# Patient Record
Sex: FEMALE | Race: WHITE | NOT HISPANIC OR LATINO | Employment: FULL TIME | URBAN - METROPOLITAN AREA
[De-identification: names, ages, dates, MRNs, and addresses within clinical notes are randomized per-mention and may not be internally consistent; named-entity substitution may affect disease eponyms.]

---

## 2019-06-10 ENCOUNTER — TRANSFERRED RECORDS (OUTPATIENT)
Dept: MULTI SPECIALTY CLINIC | Facility: CLINIC | Age: 44
End: 2019-06-10

## 2019-06-10 LAB
ALT SERPL-CCNC: 9 U/L (ref 6–29)
AST SERPL-CCNC: 13 U/L (ref 10–35)
CHOLEST SERPL-MCNC: 283 MG/DL
CREAT SERPL-MCNC: 0.65 MG/DL (ref 0.5–1.1)
GFR SERPL CREATININE-BSD FRML MDRD: 107 ML/MIN/1.73M2
GLUCOSE SERPL-MCNC: 83 MG/DL (ref 65–99)
HDLC SERPL-MCNC: 67 MG/DL
HPV ABSTRACT: NORMAL
NONHDLC SERPL-MCNC: 216 MG/DL
PAP-ABSTRACT: NORMAL
POTASSIUM SERPL-SCNC: 3.9 MMOL/L (ref 3.5–5.3)
TRIGL SERPL-MCNC: 218 MG/DL

## 2020-07-07 ENCOUNTER — OFFICE VISIT (OUTPATIENT)
Dept: FAMILY MEDICINE | Facility: CLINIC | Age: 45
End: 2020-07-07
Payer: COMMERCIAL

## 2020-07-07 VITALS
SYSTOLIC BLOOD PRESSURE: 123 MMHG | HEART RATE: 60 BPM | BODY MASS INDEX: 21.85 KG/M2 | HEIGHT: 64 IN | TEMPERATURE: 98.6 F | RESPIRATION RATE: 16 BRPM | DIASTOLIC BLOOD PRESSURE: 77 MMHG | WEIGHT: 128 LBS | OXYGEN SATURATION: 100 %

## 2020-07-07 DIAGNOSIS — Z30.41 ENCOUNTER FOR SURVEILLANCE OF CONTRACEPTIVE PILLS: ICD-10-CM

## 2020-07-07 DIAGNOSIS — Z01.419 ENCOUNTER FOR GYNECOLOGICAL EXAMINATION WITHOUT ABNORMAL FINDING: ICD-10-CM

## 2020-07-07 DIAGNOSIS — Z12.31 ENCOUNTER FOR SCREENING MAMMOGRAM FOR BREAST CANCER: ICD-10-CM

## 2020-07-07 DIAGNOSIS — Z13.1 SCREENING FOR DIABETES MELLITUS: ICD-10-CM

## 2020-07-07 DIAGNOSIS — F33.42 RECURRENT MAJOR DEPRESSIVE DISORDER, IN FULL REMISSION (H): ICD-10-CM

## 2020-07-07 DIAGNOSIS — Z13.220 LIPID SCREENING: ICD-10-CM

## 2020-07-07 DIAGNOSIS — R87.619 ABNORMAL CERVICAL PAPANICOLAOU SMEAR, UNSPECIFIED ABNORMAL PAP FINDING: ICD-10-CM

## 2020-07-07 DIAGNOSIS — M54.50 ACUTE BILATERAL LOW BACK PAIN WITHOUT SCIATICA: ICD-10-CM

## 2020-07-07 DIAGNOSIS — J30.2 SEASONAL ALLERGIES: ICD-10-CM

## 2020-07-07 DIAGNOSIS — Z00.00 ROUTINE GENERAL MEDICAL EXAMINATION AT A HEALTH CARE FACILITY: Primary | ICD-10-CM

## 2020-07-07 DIAGNOSIS — Z80.3 FAMILY HISTORY OF MALIGNANT NEOPLASM OF BREAST: ICD-10-CM

## 2020-07-07 LAB
CHOLEST SERPL-MCNC: 234 MG/DL
GLUCOSE SERPL-MCNC: 88 MG/DL (ref 70–99)
HDLC SERPL-MCNC: 60 MG/DL
LDLC SERPL CALC-MCNC: 135 MG/DL
NONHDLC SERPL-MCNC: 174 MG/DL
TRIGL SERPL-MCNC: 196 MG/DL

## 2020-07-07 PROCEDURE — 99386 PREV VISIT NEW AGE 40-64: CPT | Performed by: FAMILY MEDICINE

## 2020-07-07 PROCEDURE — 82947 ASSAY GLUCOSE BLOOD QUANT: CPT | Performed by: FAMILY MEDICINE

## 2020-07-07 PROCEDURE — 87624 HPV HI-RISK TYP POOLED RSLT: CPT | Performed by: FAMILY MEDICINE

## 2020-07-07 PROCEDURE — 99213 OFFICE O/P EST LOW 20 MIN: CPT | Mod: 25 | Performed by: FAMILY MEDICINE

## 2020-07-07 PROCEDURE — 80061 LIPID PANEL: CPT | Performed by: FAMILY MEDICINE

## 2020-07-07 PROCEDURE — 36415 COLL VENOUS BLD VENIPUNCTURE: CPT | Performed by: FAMILY MEDICINE

## 2020-07-07 PROCEDURE — 88175 CYTOPATH C/V AUTO FLUID REDO: CPT | Performed by: FAMILY MEDICINE

## 2020-07-07 RX ORDER — SERTRALINE HYDROCHLORIDE 100 MG/1
TABLET, FILM COATED ORAL
COMMUNITY
End: 2021-01-06

## 2020-07-07 RX ORDER — SERTRALINE HYDROCHLORIDE 100 MG/1
TABLET, FILM COATED ORAL
Status: CANCELLED | OUTPATIENT
Start: 2020-07-07

## 2020-07-07 RX ORDER — SERTRALINE HYDROCHLORIDE 100 MG/1
100 TABLET, FILM COATED ORAL DAILY
Qty: 90 TABLET | Refills: 1 | Status: SHIPPED | OUTPATIENT
Start: 2020-07-07 | End: 2021-01-06

## 2020-07-07 RX ORDER — CYCLOBENZAPRINE HCL 5 MG
5-10 TABLET ORAL 3 TIMES DAILY PRN
Qty: 30 TABLET | Refills: 0 | Status: SHIPPED | OUTPATIENT
Start: 2020-07-07

## 2020-07-07 RX ORDER — NORGESTIMATE AND ETHINYL ESTRADIOL 0.25-0.035
1 KIT ORAL DAILY
COMMUNITY
End: 2020-07-07

## 2020-07-07 RX ORDER — FEXOFENADINE HCL 180 MG/1
180 TABLET ORAL DAILY
COMMUNITY

## 2020-07-07 RX ORDER — NORGESTIMATE AND ETHINYL ESTRADIOL 0.25-0.035
1 KIT ORAL DAILY
Qty: 84 TABLET | Refills: 3 | Status: SHIPPED | OUTPATIENT
Start: 2020-07-07 | End: 2021-06-24

## 2020-07-07 ASSESSMENT — PATIENT HEALTH QUESTIONNAIRE - PHQ9: SUM OF ALL RESPONSES TO PHQ QUESTIONS 1-9: 0

## 2020-07-07 ASSESSMENT — MIFFLIN-ST. JEOR: SCORE: 1209.57

## 2020-07-07 NOTE — PROGRESS NOTES
SUBJECTIVE:   CC: Maria G Khan is an 45 year old woman who presents for preventive health visit.     Healthy Habits:    Do you get at least three servings of calcium containing foods daily (dairy, green leafy vegetables, etc.)? no, taking calcium and/or vitamin D supplement: no    Amount of exercise or daily activities, outside of work: 2-3 day(s) per week    Problems taking medications regularly No    Medication side effects: No    Have you had an eye exam in the past two years? yes    Do you see a dentist twice per year? no    Do you have sleep apnea, excessive snoring or daytime drowsiness?no    Transferring cares from [place].   Family history of breast cancer. Mom diagnosed with breast cancer at age 45. Pt gets yearly mammograms.   She has history of abnormal pap, advised to repeat pap 06/2020. Last pap 06/2019 was normal. JOSE ALEJANDRO obtained.     Back ache a few years ago. This last time she bought the house and moving containers arrived. She was fixing the mattress and has lower back tightness. She hasn't been stretching. Pain is worse in the morning and slightly improves throughout the day. Pain is moderate, non radiating, no aggravating or reliving factors. No paresthesias, urinary retention, bowel incontinence or saddle anesthesia.       Today's PHQ-2 Score:    PHQ 7/7/2020   PHQ-9 Total Score 0   Q9: Thoughts of better off dead/self-harm past 2 weeks Not at all     Abuse: Current or Past(Physical, Sexual or Emotional)- No  Do you feel safe in your environment? Yes        Social History     Tobacco Use     Smoking status: Not on file   Substance Use Topics     Alcohol use: Not on file     If you drink alcohol do you typically have >3 drinks per day or >7 drinks per week? No                     Reviewed orders with patient.  Reviewed health maintenance and updated orders accordingly - Yes      Mammogram Screening: Patient under age 50, mutual decision reflected in health maintenance.      Pertinent mammograms  "are reviewed under the imaging tab.  History of abnormal Pap smear: YES - updated in Problem List and Health Maintenance accordingly     Reviewed and updated as needed this visit by clinical staff         Reviewed and updated as needed this visit by Provider    ROS:  CONSTITUTIONAL: NEGATIVE for fever, chills, change in weight  INTEGUMENTARU/SKIN: NEGATIVE for worrisome rashes, moles or lesions  EYES: NEGATIVE for vision changes or irritation  ENT: NEGATIVE for ear, mouth and throat problems  RESP: NEGATIVE for significant cough or SOB  BREAST: NEGATIVE for masses, tenderness or discharge  CV: NEGATIVE for chest pain, palpitations or peripheral edema  GI: NEGATIVE for nausea, abdominal pain, heartburn, or change in bowel habits  : NEGATIVE for unusual urinary or vaginal symptoms. Periods are regular.  MUSCULOSKELETAL:   NEURO: NEGATIVE for weakness, dizziness or paresthesias  PSYCHIATRIC: NEGATIVE for changes in mood or affect    OBJECTIVE:   BP (!) 140/76 (BP Location: Left arm, Patient Position: Chair, Cuff Size: Adult Regular)   Pulse 60   Temp 98.6  F (37  C) (Oral)   Resp 16   Ht 1.632 m (5' 4.25\")   Wt 58.1 kg (128 lb)   SpO2 100%   BMI 21.80 kg/m      EXAM:  GENERAL: healthy, alert and no distress  EYES: Eyes grossly normal to inspection, PERRL and conjunctivae and sclerae normal  HENT: ear canals and TM's normal, nose and mouth without ulcers or lesions  NECK: no adenopathy, no asymmetry, masses, or scars and thyroid normal to palpation  RESP: lungs clear to auscultation - no rales, rhonchi or wheezes  BREAST: normal without masses, tenderness or nipple discharge and no palpable axillary masses or adenopathy  CV: regular rate and rhythm, normal S1 S2  ABDOMEN: soft, nontender, no hepatosplenomegaly, no masses and bowel sounds normal   (female): normal female external genitalia, normal urethral meatus, vaginal mucosa pink, moist, well rugated, and normal cervix/adnexa/uterus without masses or " discharge  MS: no gross musculoskeletal defects noted, no edema  SKIN: no suspicious lesions or rashes  NEURO: Normal strength and tone, mentation intact and speech normal  PSYCH: mentation appears normal, affect normal/bright    Diagnostic Test Results:  none     ASSESSMENT/PLAN:       Routine general medical examination at a health care facility    Recurrent major depressive disorder, in full remission (H)  PHQ 7/7/2020   PHQ-9 Total Score 0   Q9: Thoughts of better off dead/self-harm past 2 weeks Not at all     - sertraline (ZOLOFT) 100 MG tablet; Take 1 tablet (100 mg) by mouth daily  Dispense: 90 tablet; Refill: 1    Encounter for surveillance of contraceptive pills  - norgestimate-ethinyl estradiol (ORTHO-CYCLEN) 0.25-35 MG-MCG tablet; Take 1 tablet by mouth daily  Dispense: 84 tablet; Refill: 3    Encounter for gynecological examination without abnormal finding  - Pap imaged thin layer diagnostic with HPV (select HPV order below)  - HPV High Risk Types DNA Cervical    Encounter for screening mammogram for breast cancer  - *MA Screening Digital Bilateral; Future    Family history of malignant neoplasm of breast  - *MA Screening Digital Bilateral; Future    Lipid screening  - Lipid Profile (Chol, Trig, HDL, LDL calc)    Screening for diabetes mellitus  - Glucose    Acute bilateral low back pain without sciatica  - likely strain  - recommended symptomatic tx  - cyclobenzaprine (FLEXERIL) 5 MG tablet; Take 1-2 tablets (5-10 mg) by mouth 3 times daily as needed for muscle spasms  Dispense: 30 tablet; Refill: 0; advised to not drive or take with alcohol       COUNSELING:   Reviewed preventive health counseling, as reflected in patient instructions    There is no height or weight on file to calculate BMI.         has no history on file for tobacco.      Counseling Resources:  ATP IV Guidelines  Pooled Cohorts Equation Calculator  Breast Cancer Risk Calculator  FRAX Risk Assessment  ICSI Preventive  Guidelines  Dietary Guidelines for Americans, 2010  USDA's MyPlate  ASA Prophylaxis  Lung CA Screening    Patricia Long MD  [facility]

## 2020-07-07 NOTE — Clinical Note
Please abstract the following data from this visit with this patient into the appropriate field in Epic:    Tests that can be patient reported without a hard copy:    {Quality Abstract List (Optional):932426}    Other Tests found in the patient's chart through Chart Review/Care Everywhere:    Pap smear done by this [facility] on this date: 6/10/19 normal.    Note to Abstraction: If this section is blank, no results were found via Chart Review/Care Everywhere.

## 2020-07-07 NOTE — PATIENT INSTRUCTIONS
Mammogram [phone]         Preventive Health Recommendations  Female Ages 40 to 49    Yearly exam:     See your health care provider every year in order to  1. Review health changes.   2. Discuss preventive care.    3. Review your medicines if your doctor prescribed any.      Get a Pap test every three years (unless you have an abnormal result and your provider advises testing more often).      If you get Pap tests with HPV test, you only need to test every 5 years, unless you have an abnormal result. You do not need a Pap test if your uterus was removed (hysterectomy) and you have not had cancer.      You should be tested each year for STDs (sexually transmitted diseases), if you're at risk.     Ask your doctor if you should have a mammogram.      Have a colonoscopy (test for colon cancer) if someone in your family has had colon cancer or polyps before age 50.       Have a cholesterol test every 5 years.       Have a diabetes test (fasting glucose) after age 45. If you are at risk for diabetes, you should have this test every 3 years.    Shots: Get a flu shot each year. Get a tetanus shot every 10 years.     Nutrition:     Eat at least 5 servings of fruits and vegetables each day.    Eat whole-grain bread, whole-wheat pasta and brown rice instead of white grains and rice.    Get adequate Calcium and Vitamin D.      Lifestyle    Exercise at least 150 minutes a week (an average of 30 minutes a day, 5 days a week). This will help you control your weight and prevent disease.    Limit alcohol to one drink per day.    No smoking.     Wear sunscreen to prevent skin cancer.    See your dentist every six months for an exam and cleaning.

## 2020-07-09 LAB
COPATH REPORT: NORMAL
PAP: NORMAL

## 2020-07-14 LAB
FINAL DIAGNOSIS: NORMAL
HPV HR 12 DNA CVX QL NAA+PROBE: NEGATIVE
HPV16 DNA SPEC QL NAA+PROBE: NEGATIVE
HPV18 DNA SPEC QL NAA+PROBE: NEGATIVE
SPECIMEN DESCRIPTION: NORMAL
SPECIMEN SOURCE CVX/VAG CYTO: NORMAL

## 2020-07-22 ENCOUNTER — ANCILLARY PROCEDURE (OUTPATIENT)
Dept: MAMMOGRAPHY | Facility: CLINIC | Age: 45
End: 2020-07-22
Attending: FAMILY MEDICINE
Payer: COMMERCIAL

## 2020-07-22 DIAGNOSIS — Z80.3 FAMILY HISTORY OF MALIGNANT NEOPLASM OF BREAST: ICD-10-CM

## 2020-07-22 DIAGNOSIS — Z12.31 ENCOUNTER FOR SCREENING MAMMOGRAM FOR BREAST CANCER: ICD-10-CM

## 2021-01-05 NOTE — PROGRESS NOTES
Maria G Khan is a 45 year old female who is being evaluated via a billable video visit.      How would you like to obtain your AVS? MyChart  If the video visit is dropped, the invitation should be resent by: Send to e-mail at: [email or web address]  Will anyone else be joining your video visit? No      Video Start Time: 12:08pm  Assessment & Plan     (F33.42) Recurrent major depressive disorder, in full remission (H)  (primary encounter diagnosis)  Comment: controlled, tolerating sertraline without side effects.  Has tried wean in the past with worsening symptoms  Plan: sertraline (ZOLOFT) 100 MG tablet        Cont current regimen.  Cont self care practices.  Follow up annually and if worsening symptoms.                     No follow-ups on file.    [facility]     Maria G Khan is a 45 year old who presents to clinic today for the following health issues    HPI       Depression Followup    How are you doing with your depression since your last visit? Improved     Are you having other symptoms that might be associated with depression? No    Have you had a significant life event?  No     Are you feeling anxious or having panic attacks?   No    Do you have any concerns with your use of alcohol or other drugs? No    Social History     Tobacco Use     Smoking status: Never Smoker     Smokeless tobacco: Never Used   Substance Use Topics     Alcohol use: Yes     Drug use: Never     PHQ 7/7/2020   PHQ-9 Total Score 0   Q9: Thoughts of better off dead/self-harm past 2 weeks Not at all     No flowsheet data found.  Last PHQ-9 1/6/2021   1.  Little interest or pleasure in doing things 1   2.  Feeling down, depressed, or hopeless 0   3.  Trouble falling or staying asleep, or sleeping too much 1   4.  Feeling tired or having little energy 1   5.  Poor appetite or overeating 0   6.  Feeling bad about yourself 0   7.  Trouble concentrating 1   8.   Moving slowly or restless 0   Q9: Thoughts of better off dead/self-harm past 2 weeks 0   PHQ-9 Total Score 4   Difficulty at work, home, or with people -         Suicide Assessment Five-step Evaluation and Treatment (SAFE-T)      How many servings of fruits and vegetables do you eat daily?  2-3    On average, how many sweetened beverages do you drink each day (Examples: soda, juice, sweet tea, etc.  Do NOT count diet or artificially sweetened beverages)?   2    How many days per week do you exercise enough to make your heart beat faster? 3 or less    How many minutes a day do you exercise enough to make your heart beat faster? 60 or more    How many days per week do you miss taking your medication? 0      Here today for sertraline renewal.  Recently transferred care here from [place].    Hx of depression, symptoms ongoing since age 12-13, diagnosed age 20.  On the sertraline since age 20.  Initially on 50mg dose, increased at some point to 100mg.  This works well for her.  No side effects.  Did wean off medication when she ran out once, had resurgence of symptoms.     living in [place], he hasnt found job in [place] yet.  Lives with 3 dogs.  Friend lives 3 blocks away.  Bought a house over the summer.   comes up when he can.  She does have 2 friends who live in the neighborhood who she sees.  Had been taking ballet classes prior to covid 5 times a week.  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      Has seen therapist during time of stress after move to [place] and was having marriage problems.      From [place] originally, moved here for job at [employer].  Works as , working virtually currently.  Tough to be apart from .  He is .        Review of Systems   Constitutional, HEENT, cardiovascular, pulmonary, gi and gu systems are negative, except as otherwise noted.      Objective           Vitals:  No vitals were obtained today due to virtual visit.    Physical Exam    GENERAL: Healthy, alert and no distress  EYES: Eyes grossly normal to inspection.  No discharge or erythema, or obvious scleral/conjunctival abnormalities.  RESP: No audible wheeze, cough, or visible cyanosis.  No visible retractions or increased work of breathing.    SKIN: Visible skin clear. No significant rash, abnormal pigmentation or lesions.  NEURO: Cranial nerves grossly intact.  Mentation and speech appropriate for age.  PSYCH: Mentation appears normal, affect normal/bright, judgement and insight intact, normal speech and appearance well-groomed.          Video-Visit Details    Type of service:  Video Visit    Video End Time:12:27 PM    Originating Location (pt. Location): Home    Distant Location (provider location):  [facility]     Platform used for Video Visit: [employer]

## 2021-01-06 ENCOUNTER — VIRTUAL VISIT (OUTPATIENT)
Dept: FAMILY MEDICINE | Facility: CLINIC | Age: 46
End: 2021-01-06
Payer: COMMERCIAL

## 2021-01-06 DIAGNOSIS — F33.42 RECURRENT MAJOR DEPRESSIVE DISORDER, IN FULL REMISSION (H): Primary | ICD-10-CM

## 2021-01-06 PROCEDURE — 99213 OFFICE O/P EST LOW 20 MIN: CPT | Mod: 95 | Performed by: NURSE PRACTITIONER

## 2021-01-06 RX ORDER — SERTRALINE HYDROCHLORIDE 100 MG/1
100 TABLET, FILM COATED ORAL DAILY
Qty: 90 TABLET | Refills: 3 | Status: SHIPPED | OUTPATIENT
Start: 2021-01-06 | End: 2021-06-24

## 2021-01-06 ASSESSMENT — PATIENT HEALTH QUESTIONNAIRE - PHQ9: SUM OF ALL RESPONSES TO PHQ QUESTIONS 1-9: 4

## 2021-06-24 ENCOUNTER — OFFICE VISIT (OUTPATIENT)
Dept: FAMILY MEDICINE | Facility: CLINIC | Age: 46
End: 2021-06-24
Payer: COMMERCIAL

## 2021-06-24 VITALS
SYSTOLIC BLOOD PRESSURE: 126 MMHG | OXYGEN SATURATION: 97 % | WEIGHT: 129 LBS | DIASTOLIC BLOOD PRESSURE: 78 MMHG | TEMPERATURE: 98 F | BODY MASS INDEX: 21.49 KG/M2 | HEIGHT: 65 IN | HEART RATE: 65 BPM | RESPIRATION RATE: 16 BRPM

## 2021-06-24 DIAGNOSIS — Z00.00 ROUTINE GENERAL MEDICAL EXAMINATION AT A HEALTH CARE FACILITY: Primary | ICD-10-CM

## 2021-06-24 DIAGNOSIS — Z11.4 ENCOUNTER FOR SCREENING FOR HIV: ICD-10-CM

## 2021-06-24 DIAGNOSIS — F33.42 RECURRENT MAJOR DEPRESSIVE DISORDER, IN FULL REMISSION (H): ICD-10-CM

## 2021-06-24 DIAGNOSIS — Z13.220 LIPID SCREENING: ICD-10-CM

## 2021-06-24 DIAGNOSIS — Z12.83 SKIN CANCER SCREENING: ICD-10-CM

## 2021-06-24 DIAGNOSIS — Z13.1 SCREENING FOR DIABETES MELLITUS: ICD-10-CM

## 2021-06-24 DIAGNOSIS — Z11.59 ENCOUNTER FOR HEPATITIS C SCREENING TEST FOR LOW RISK PATIENT: ICD-10-CM

## 2021-06-24 DIAGNOSIS — Z30.41 ENCOUNTER FOR SURVEILLANCE OF CONTRACEPTIVE PILLS: ICD-10-CM

## 2021-06-24 DIAGNOSIS — Z12.31 ENCOUNTER FOR SCREENING MAMMOGRAM FOR BREAST CANCER: ICD-10-CM

## 2021-06-24 LAB
CHOLEST SERPL-MCNC: 230 MG/DL
GLUCOSE SERPL-MCNC: 92 MG/DL (ref 70–99)
HCV AB SERPL QL IA: NONREACTIVE
HDLC SERPL-MCNC: 74 MG/DL
HIV 1+2 AB+HIV1 P24 AG SERPL QL IA: NONREACTIVE
LDLC SERPL CALC-MCNC: 122 MG/DL
NONHDLC SERPL-MCNC: 156 MG/DL
TRIGL SERPL-MCNC: 170 MG/DL

## 2021-06-24 PROCEDURE — 87389 HIV-1 AG W/HIV-1&-2 AB AG IA: CPT | Performed by: FAMILY MEDICINE

## 2021-06-24 PROCEDURE — 80061 LIPID PANEL: CPT | Performed by: FAMILY MEDICINE

## 2021-06-24 PROCEDURE — 99396 PREV VISIT EST AGE 40-64: CPT | Performed by: FAMILY MEDICINE

## 2021-06-24 PROCEDURE — 86803 HEPATITIS C AB TEST: CPT | Performed by: FAMILY MEDICINE

## 2021-06-24 PROCEDURE — 36415 COLL VENOUS BLD VENIPUNCTURE: CPT | Performed by: FAMILY MEDICINE

## 2021-06-24 PROCEDURE — 82947 ASSAY GLUCOSE BLOOD QUANT: CPT | Performed by: FAMILY MEDICINE

## 2021-06-24 RX ORDER — NORGESTIMATE AND ETHINYL ESTRADIOL 0.25-0.035
1 KIT ORAL DAILY
Qty: 84 TABLET | Refills: 3 | Status: SHIPPED | OUTPATIENT
Start: 2021-06-24 | End: 2022-05-03

## 2021-06-24 RX ORDER — SERTRALINE HYDROCHLORIDE 100 MG/1
100 TABLET, FILM COATED ORAL DAILY
Qty: 90 TABLET | Refills: 3 | Status: SHIPPED | OUTPATIENT
Start: 2021-06-24 | End: 2022-06-24

## 2021-06-24 ASSESSMENT — MIFFLIN-ST. JEOR: SCORE: 1213.08

## 2021-06-24 ASSESSMENT — PATIENT HEALTH QUESTIONNAIRE - PHQ9: SUM OF ALL RESPONSES TO PHQ QUESTIONS 1-9: 0

## 2021-06-24 NOTE — PROGRESS NOTES
SUBJECTIVE:   CC: Maria G Khan is an 46 year old woman who presents for preventive health visit.     Patient has been advised of split billing requirements and indicates understanding: Yes  Healthy Habits:    Do you get at least three servings of calcium containing foods daily (dairy, green leafy vegetables, etc.)?  no     Amount of exercise or daily activities, outside of work: 4 days/week     Problems taking medications regularly No    Medication side effects: No    Have you had an eye exam in the past two years? yes    Do you see a dentist twice per year? no    Do you have sleep apnea, excessive snoring or daytime drowsiness?no      Lab Results   Component Value Date    PAP NIL 07/07/2020   Birth control - stable, no side effects, needs refills        Today's PHQ-2 Score: see phq9    Abuse: Current or Past(Physical, Sexual or Emotional)- No  Do you feel safe in your environment? No    Have you ever done Advance Care Planning? (For example, a Health Directive, POLST, or a discussion with a medical provider or your loved ones about your wishes): Yes, patient states has an Advance Care Planning document and will bring a copy to the clinic.    Social History     Tobacco Use     Smoking status: Never Smoker     Smokeless tobacco: Never Used   Substance Use Topics     Alcohol use: Yes     If you drink alcohol do you typically have >3 drinks per day or >7 drinks per week? No                     Reviewed orders with patient.  Reviewed health [place]  Pertinent mammograms are reviewed under the imaging tab.    Pertinent mammograms are reviewed under the imaging tab.  History of abnormal Pap smear: NO - age 30- 65 PAP every 3 years recommended  PAP / HPV Latest Ref Rng & Units 7/7/2020   PAP - NIL   HPV 16 DNA NEG:Negative Negative   HPV 18 DNA NEG:Negative Negative   OTHER HR HPV NEG:Negative Negative     Reviewed and updated as needed this visit by clinical staff  Tobacco  Allergies  Meds   Med Hx  Surg Hx   "Fam Hx  Soc Hx        Reviewed and updated as needed this visit by Provider                    ROS:  CONSTITUTIONAL: NEGATIVE for fever, chills, change in weight  INTEGUMENTARU/SKIN: NEGATIVE for worrisome rashes, moles or lesions  EYES: NEGATIVE for vision changes or irritation  ENT: NEGATIVE for ear, mouth and throat problems  RESP: NEGATIVE for significant cough or SOB  BREAST: NEGATIVE for masses, tenderness or discharge  CV: NEGATIVE for chest pain, palpitations or peripheral edema  GI: NEGATIVE for nausea, abdominal pain, heartburn, or change in bowel habits  : NEGATIVE for unusual urinary or vaginal symptoms. Periods are regular.  MUSCULOSKELETAL: NEGATIVE for significant arthralgias or myalgia  NEURO: NEGATIVE for weakness, dizziness or paresthesias  PSYCHIATRIC: NEGATIVE for changes in mood or affect    OBJECTIVE:   /78 (BP Location: Right arm, Patient Position: Chair, Cuff Size: Adult Regular)   Pulse 65   Temp 98  F (36.7  C) (Tympanic)   Resp 16   Ht 1.638 m (5' 4.5\")   Wt 58.5 kg (129 lb)   LMP  (LMP Unknown)   SpO2 97%   BMI 21.80 kg/m    EXAM:  GENERAL: healthy, alert and no distress  EYES: Eyes grossly normal to inspection, P conjunctivae and sclerae normal  HENT: ear canals and TM's normal, nose and mouth without ulcers or lesions  NECK: no adenopathy, no asymmetry, masses, or scars and thyroid normal to palpation  RESP: lungs clear to auscultation - no rales, rhonchi or wheezes  BREAST: normal without masses, tenderness or nipple discharge and no palpable axillary masses or adenopathy  CV: regular rate and rhythm, normal S1 S2  ABDOMEN: soft, nontender, no hepatosplenomegaly, no masses and bowel sounds normal  MS: no gross musculoskeletal defects noted, no edema  SKIN: no suspicious lesions or rashes  NEURO: Normal strength and tone, mentation intact and speech normal  PSYCH: mentation appears normal, affect normal/bright    Diagnostic Test Results:  Labs reviewed in " "Epic    ASSESSMENT/PLAN:     1. Routine general medical examination at a health care facility  Up to date with tetanus - ~ 2013      2. Recurrent major depressive disorder, in full remission (H)  PHQ9 0  - sertraline (ZOLOFT) 100 MG tablet; Take 1 tablet (100 mg) by mouth daily  Dispense: 90 tablet; Refill: 3  - refilled medication for one year, repeat PHQ 9 in six months    3. Encounter for surveillance of contraceptive pills  - stable refill for one  year  - norgestimate-ethinyl estradiol (ORTHO-CYCLEN) 0.25-35 MG-MCG tablet; Take 1 tablet by mouth daily  Dispense: 84 tablet; Refill: 3    4. Encounter for screening mammogram for breast cancer  - [place] Screening Digital Bilateral; Future    5. Lipid screening  - Lipid Profile (Chol, Trig, HDL, LDL calc)    6. Screening for diabetes mellitus  - Glucose    7. Encounter for screening for HIV  - HIV Antigen Antibody Combo    8. Skin cancer screening  - ADULT DERMATOLOGY REFERRAL; Future    9. Encounter for hepatitis C screening test for low risk patient  - Hepatitis C Screen Reflex to HCV RNA Quant and Genotype          Patient has been advised of split billing requirements and indicates understanding: Yes  COUNSELING:   Reviewed preventive health counseling, as reflected in patient instructions    Estimated body mass index is 21.8 kg/m  as calculated from the following:    Height as of this encounter: 1.638 m (5' 4.5\").    Weight as of this encounter: 58.5 kg (129 lb).        She reports that she has never smoked. She has never used smokeless tobacco.      Counseling Resources:  ATP IV Guidelines  Pooled Cohorts Equation Calculator  Breast Cancer Risk Calculator  BRCA-Related Cancer Risk Assessment: FHS-7 Tool  FRAX Risk Assessment  ICSI Preventive Guidelines  Dietary Guidelines for Americans, 2010  USDA's MyPlate  ASA Prophylaxis  Lung CA Screening    Patricia Long MD  [facility]  "

## 2021-07-29 ENCOUNTER — ANCILLARY PROCEDURE (OUTPATIENT)
Dept: MAMMOGRAPHY | Facility: CLINIC | Age: 46
End: 2021-07-29
Attending: FAMILY MEDICINE
Payer: COMMERCIAL

## 2021-07-29 DIAGNOSIS — Z12.31 ENCOUNTER FOR SCREENING MAMMOGRAM FOR BREAST CANCER: ICD-10-CM

## 2021-07-29 PROCEDURE — 77063 BREAST TOMOSYNTHESIS BI: CPT | Mod: GC | Performed by: RADIOLOGY

## 2021-07-29 PROCEDURE — 77067 SCR MAMMO BI INCL CAD: CPT | Mod: GC | Performed by: RADIOLOGY

## 2021-09-02 ENCOUNTER — OFFICE VISIT (OUTPATIENT)
Dept: DERMATOLOGY | Facility: CLINIC | Age: 46
End: 2021-09-02
Attending: FAMILY MEDICINE
Payer: COMMERCIAL

## 2021-09-02 DIAGNOSIS — D18.01 CHERRY ANGIOMA: ICD-10-CM

## 2021-09-02 DIAGNOSIS — Z12.83 SKIN CANCER SCREENING: ICD-10-CM

## 2021-09-02 DIAGNOSIS — L29.9 BRACHIORADIAL PRURITUS: Primary | ICD-10-CM

## 2021-09-02 DIAGNOSIS — D23.9 DERMATOFIBROMA: ICD-10-CM

## 2021-09-02 PROCEDURE — 99203 OFFICE O/P NEW LOW 30 MIN: CPT | Mod: GC | Performed by: DERMATOLOGY

## 2021-09-02 RX ORDER — TRIAMCINOLONE ACETONIDE 1 MG/G
CREAM TOPICAL 2 TIMES DAILY
Qty: 30 G | Refills: 1 | Status: SHIPPED | OUTPATIENT
Start: 2021-09-02 | End: 2023-12-03

## 2021-09-02 ASSESSMENT — PAIN SCALES - GENERAL: PAINLEVEL: NO PAIN (0)

## 2021-09-02 NOTE — NURSING NOTE
Dermatology Rooming Note    Maria G Khan's goals for this visit include:   Chief Complaint   Patient presents with     Derm Problem     Here for full skin check today     Tamie Meadows RN

## 2021-09-02 NOTE — PATIENT INSTRUCTIONS
Patient Education     Checking for Skin Cancer  You can find cancer early by checking your skin each month. There are 3 kinds of skin cancer. They are melanoma, basal cell carcinoma, and squamous cell carcinoma. Doing monthly skin checks is the best way to find new marks or skin changes. Follow the instructions below for checking your skin.   The ABCDEs of checking moles for melanoma   Check your moles or growths for signs of melanoma using ABCDE:     Asymmetry: the sides of the mole or growth don t match    Border: the edges are ragged, notched, or blurred    Color: the color within the mole or growth varies    Diameter: the mole or growth is larger than 6 mm (size of a pencil eraser)    Evolving: the size, shape, or color of the mole or growth is changing (evolving is not shown in the images below)    Checking for other types of skin cancer  Basal cell carcinoma or squamous cell carcinoma have symptoms such as:       A spot or mole that looks different from all other marks on your skin    Changes in how an area feels, such as itching, tenderness, or pain    Changes in the skin's surface, such as oozing, bleeding, or scaliness    A sore that does not heal    New swelling or redness beyond the border of a mole    Who s at risk?  Anyone can get skin cancer. But you are at greater risk if you have:     Fair skin, light-colored hair, or light-colored eyes    Many moles or abnormal moles on your skin    A history of sunburns from sunlight or tanning beds    A family history of skin cancer    A history of exposure to radiation or chemicals    A weakened immune system  If you have had skin cancer in the past, you are at risk for recurring skin cancer.   How to check your skin  Do your monthly skin checkups in front of a full-length mirror. Check all parts of your body, including your:     Head (ears, face, neck, and scalp)    Torso (front, back, and sides)    Arms (tops, undersides, upper, and lower armpits)    Hands  (palms, backs, and fingers, including under the nails)    Buttocks and genitals    Legs (front, back, and sides)    Feet (tops, soles, toes, including under the nails, and between toes)  If you have a lot of moles, take digital photos of them each month. Make sure to take photos both up close and from a distance. These can help you see if any moles change over time.   Most skin changes are not cancer. But if you see any changes in your skin, call your doctor right away. Only he or she can diagnose a problem. If you have skin cancer, seeing your doctor can be the first step toward getting the treatment that could save your life.   [employer] last reviewed this educational content on 4/1/2019 2000-2020 [employer]. [address] [place] [address]. All rights reserved. This information is not intended as a substitute for professional medical care. Always follow your healthcare professional's instructions.       When should I call my doctor?    If you are worsening or not improving, please, contact us or seek urgent care as noted below.     Who should I call with questions (adults)?    [facility] (adult and pediatric): [phone]    [facility] (adult): [phone]    For urgent needs outside of business hours call the [facility] at [phone] and ask for the dermatology resident on call to be paged    If this is a medical emergency and you are unable to reach an ER, Call [phone]    Who should I call with questions (pediatric)?  [facility]- Pediatric Dermatology  Dr. Priscilla Cueva, Dr. Galo Bean, Dr. Yasmin Olivo, CAR Mcclain, Dr. Priscila Almaguer, Dr. Urszula Valencia & Dr. Charly Chou  Non-urgent nurse triage line; [phone]- Josephine and Jayne DE LA ROSA Care Coordinatorlaina Jin (/Complex ) [phone]    If you need a prescription refill, please contact your  pharmacy. Refills are approved or denied by our Physicians during normal business hours, Monday through Fridays  Per office policy, refills will not be granted if you have not been seen within the past year (or sooner depending on your child's condition)    Scheduling Information:  Pediatric Appointment Scheduling and Call Center [phone]  Radiology Scheduling- [phone]  Sedation Unit Scheduling- [phone]  [place] Scheduling- [facility] [phone]; Pediatric Dermatology [phone]  Main  Services: [phone]  Afghan: [phone]  Sammarinese: [phone]  Hmong/Taiwanese/Masood: [phone]  Preadmission Nursing Department Fax Number: [phone] (Fax all pre-operative paperwork to this number)    For urgent matters arising during evenings, weekends, or holidays that cannot wait for normal business hours please call [phone] and ask for the dermatology resident on call to be paged.

## 2021-09-02 NOTE — LETTER
9/2/2021       RE: Maria G Khan  [address]  [facility] [address]     Dear Colleague,    Thank you for referring your patient, Maria G Khan, to the [facility]MAT[facility] [place] at [facility]. Please see a copy of my visit note below.    [facility] Dermatology Note  Encounter Date: Sep 2, 2021  Office Visit     Dermatology Problem List:  1. Multiple benign melanocytic nevi  2. Brachioradial pruritus, R forearm   - TAC 0.1% cream BID PRN  ____________________________________________    Assessment & Plan:     # Multiple benign melanocytic nevi  - ABCDEs: Counseled ABCDEs of melanoma: Asymmetry, Border (irregularity), Color (not uniform, changes in color), Diameter (greater than 6 mm which is about the size of a pencil eraser), and Evolving (any changes in preexisting moles).  - Sun protection: Counseled SPF30+ sunscreen, UPF clothing, sun avoidance, tanning bed avoidance.     # Cherry angiomas   - Benign etiology discussed with pt, no further treatment necessary    # Brachioradial pruritus, R forearm  - Discussed etiology and recommended treatment with TAC 0.1% cream BID prn     # Dermatofibroma, L lower extremity  - benign etiology discussed with pt. No further treatment necessary     Procedures Performed:   None     Follow-up: 2 year(s) in-person, or earlier for new or changing lesions    Staff and Resident:     Sudha eMsa MD  PGY-4 Dermatology Resident    I have seen and examined this patient and agree with the assessment and plan as documented in the resident's note.    Meliton Sultana MD  Dermatology Attending     ____________________________________________    CC: Derm Problem (Here for full skin check today)    HPI:  Ms. Maria G Khan is a(n) 46 year old female who presents today as a new patient for a skin check. She states that she has not previously been seen by a dermatologist, but given her red hair  and light skin type, she should come to the dermatologist for evaluation.  She denies any personal or family history of skin cancer, but she grew up in [place] and spent a lot of time by the pool growing up. Today, she denies any bleeding, non-healing or tender lesions of concern.     Patient is otherwise feeling well, without additional skin concerns.    Labs Reviewed:  N/A    Physical Exam:  Vitals: There were no vitals taken for this visit.  SKIN: Full skin, which includes the head/face, both arms, chest, back, abdomen,both legs, genitalia and/or groin buttocks, digits and/or nails, was examined.  - There are dome shaped bright red papules on the face, chest and back.   - There is a firm tan/flesh colored papule that dimples with lateral pressure on the L lower extremity.  - Multiple regular brown pigmented macules and papules are identified on the face, chest, back, b/l upper and lower extremities.   - Scattered brown macules on sun exposed areas..   - No other lesions of concern on areas examined.     Medications:  Current Outpatient Medications   Medication     cyclobenzaprine (FLEXERIL) 5 MG tablet     fexofenadine (ALLEGRA) 180 MG tablet     Multiple Vitamins-Minerals (MULTIVITAMIN ADULT PO)     norgestimate-ethinyl estradiol (ORTHO-CYCLEN) 0.25-35 MG-MCG tablet     sertraline (ZOLOFT) 100 MG tablet     No current facility-administered medications for this visit.      Past Medical History:   Patient Active Problem List   Diagnosis     Abnormal Pap smear of cervix     MDD (major depressive disorder)     Seasonal allergies     Past Medical History:   Diagnosis Date     MDD (major depressive disorder)      Seasonal allergies        CC Patricia Long MD  [facility]  [address] [place] [phone] on close of this encounter.

## 2021-09-02 NOTE — PROGRESS NOTES
[facility] Dermatology Note  Encounter Date: Sep 2, 2021  Office Visit     Dermatology Problem List:  1. Multiple benign melanocytic nevi  2. Brachioradial pruritus, R forearm   - TAC 0.1% cream BID PRN  ____________________________________________    Assessment & Plan:     # Multiple benign melanocytic nevi  - ABCDEs: Counseled ABCDEs of melanoma: Asymmetry, Border (irregularity), Color (not uniform, changes in color), Diameter (greater than 6 mm which is about the size of a pencil eraser), and Evolving (any changes in preexisting moles).  - Sun protection: Counseled SPF30+ sunscreen, UPF clothing, sun avoidance, tanning bed avoidance.     # Cherry angiomas   - Benign etiology discussed with pt, no further treatment necessary    # Brachioradial pruritus, R forearm  - Discussed etiology and recommended treatment with TAC 0.1% cream BID prn     # Dermatofibroma, L lower extremity  - benign etiology discussed with pt. No further treatment necessary     Procedures Performed:   None     Follow-up: 2 year(s) in-person, or earlier for new or changing lesions    Staff and Resident:     Sudha Mesa MD  PGY-4 Dermatology Resident    I have seen and examined this patient and agree with the assessment and plan as documented in the resident's note.    Meliton Sultana MD  Dermatology Attending     ____________________________________________    CC: Derm Problem (Here for full skin check today)    HPI:  Ms. Maria G Khan is a(n) 46 year old female who presents today as a new patient for a skin check. She states that she has not previously been seen by a dermatologist, but given her red hair and light skin type, she should come to the dermatologist for evaluation.  She denies any personal or family history of skin cancer, but she grew up in [place] and spent a lot of time by the pool growing up. Today, she denies any bleeding, non-healing or tender lesions of concern.     Patient is otherwise feeling well,  without additional skin concerns.    Labs Reviewed:  N/A    Physical Exam:  Vitals: There were no vitals taken for this visit.  SKIN: Full skin, which includes the head/face, both arms, chest, back, abdomen,both legs, genitalia and/or groin buttocks, digits and/or nails, was examined.  - There are dome shaped bright red papules on the face, chest and back.   - There is a firm tan/flesh colored papule that dimples with lateral pressure on the L lower extremity.  - Multiple regular brown pigmented macules and papules are identified on the face, chest, back, b/l upper and lower extremities.   - Scattered brown macules on sun exposed areas..   - No other lesions of concern on areas examined.     Medications:  Current Outpatient Medications   Medication     cyclobenzaprine (FLEXERIL) 5 MG tablet     fexofenadine (ALLEGRA) 180 MG tablet     Multiple Vitamins-Minerals (MULTIVITAMIN ADULT PO)     norgestimate-ethinyl estradiol (ORTHO-CYCLEN) 0.25-35 MG-MCG tablet     sertraline (ZOLOFT) 100 MG tablet     No current facility-administered medications for this visit.      Past Medical History:   Patient Active Problem List   Diagnosis     Abnormal Pap smear of cervix     MDD (major depressive disorder)     Seasonal allergies     Past Medical History:   Diagnosis Date     MDD (major depressive disorder)      Seasonal allergies        CC Patricia Long MD  [facility]  [address] [place] [phone] on close of this encounter.

## 2021-09-25 PROBLEM — D18.01 CHERRY ANGIOMA: Status: ACTIVE | Noted: 2021-09-25

## 2021-09-25 PROBLEM — L29.9 BRACHIORADIAL PRURITUS: Status: ACTIVE | Noted: 2021-09-25

## 2021-09-25 PROBLEM — Z12.83 SKIN CANCER SCREENING: Status: ACTIVE | Noted: 2021-09-25

## 2021-09-25 PROBLEM — D23.9 DERMATOFIBROMA: Status: ACTIVE | Noted: 2021-09-25

## 2021-10-10 ENCOUNTER — HEALTH MAINTENANCE LETTER (OUTPATIENT)
Age: 46
End: 2021-10-10

## 2022-05-08 ENCOUNTER — OFFICE VISIT (OUTPATIENT)
Dept: URGENT CARE | Facility: URGENT CARE | Age: 47
End: 2022-05-08
Payer: COMMERCIAL

## 2022-05-08 ENCOUNTER — ANCILLARY PROCEDURE (OUTPATIENT)
Dept: GENERAL RADIOLOGY | Facility: CLINIC | Age: 47
End: 2022-05-08
Attending: PHYSICIAN ASSISTANT
Payer: COMMERCIAL

## 2022-05-08 VITALS
BODY MASS INDEX: 22.48 KG/M2 | TEMPERATURE: 97.3 F | SYSTOLIC BLOOD PRESSURE: 151 MMHG | HEART RATE: 71 BPM | DIASTOLIC BLOOD PRESSURE: 90 MMHG | OXYGEN SATURATION: 98 % | WEIGHT: 133 LBS

## 2022-05-08 DIAGNOSIS — M79.11 MASTICATORY MYALGIA: ICD-10-CM

## 2022-05-08 DIAGNOSIS — S09.93XA INJURY OF JAW, INITIAL ENCOUNTER: Primary | ICD-10-CM

## 2022-05-08 PROCEDURE — 99214 OFFICE O/P EST MOD 30 MIN: CPT | Performed by: PHYSICIAN ASSISTANT

## 2022-05-08 PROCEDURE — 70250 X-RAY EXAM OF SKULL: CPT | Mod: TC | Performed by: RADIOLOGY

## 2022-05-08 RX ORDER — CYCLOBENZAPRINE HCL 10 MG
10 TABLET ORAL 2 TIMES DAILY PRN
Qty: 14 TABLET | Refills: 0 | Status: SHIPPED | OUTPATIENT
Start: 2022-05-08 | End: 2022-05-10

## 2022-05-08 NOTE — PROGRESS NOTES
(S09.93XA) Injury of jaw, initial encounter  (primary encounter diagnosis)  Plan: XR Skull 1/3 Views, cyclobenzaprine (FLEXERIL)         10 MG tablet    (M79.11) Masticatory myalgia  Plan: cyclobenzaprine (FLEXERIL) 10 MG tablet    30 minutes spent on the date of the encounter doing chart review, history and exam, documentation and further activities per the note     Rest the affected area as much as possible.  Apply ice for 15-20 minutes intermittently as needed and especially after any offending activity. Hot packs are better for muscle spasms and cramping. Daily stretching as tolerated.  As pain recedes, begin normal activities slowly as tolerated.  Consider Physical Therapy after 6 weeks if symptoms not better with conservative care.      Okay to take acetaminophen 500 mg- 2 tabs (Total of 1000 mg) every 8 hrs   Okay to take ibuprofen 200 mg- 3 tabs (Total of 600 mg) every 6 hours        Luis Rosario PA-C  [facility]    Subjective   47 year old who presents to clinic today for the following health issues:    Urgent Care       HPI     Fell last night walking on sidewalk and tripped, landed on chin, cut on chin. Patient states that this occurred around 11 PM.     She cleaned it up with hydrogen peroxide, neosporin and a butterfly was placed.      Patient states that she is having bilateral jaw pain and tenderness. No numbness or tingling.     Review of Systems   Review of Systems   See HPI     Objective    Temp: 97.3  F (36.3  C) Temp src: Temporal BP: (!) 151/90 Pulse: 71     SpO2: 98 %       Physical Exam   Physical Exam  Constitutional:       General: She is not in acute distress.     Appearance: Normal appearance. She is normal weight. She is not ill-appearing, toxic-appearing or diaphoretic.   HENT:      Head: Normocephalic and atraumatic.        Comments: Mild tenderness in the areas shown above (in red) without localized erythema or swelling. There is a small cut in the blue area  shown above. Cut is well healed and without signs of infection.   Cardiovascular:      Rate and Rhythm: Normal rate.      Pulses: Normal pulses.   Pulmonary:      Effort: Pulmonary effort is normal. No respiratory distress.   Neurological:      General: No focal deficit present.      Mental Status: She is alert and oriented to person, place, and time. Mental status is at baseline.      Gait: Gait normal.   Psychiatric:         Mood and Affect: Mood normal.         Behavior: Behavior normal.         Thought Content: Thought content normal.         Judgment: Judgment normal.          No results found for this or any previous visit (from the past 24 hour(s)).      An X-ray was ordered today and reviewed by me. There does not appear to be any evidence of fracture or dislocation. Awaiting radiology report.

## 2022-05-10 ENCOUNTER — VIRTUAL VISIT (OUTPATIENT)
Dept: FAMILY MEDICINE | Facility: CLINIC | Age: 47
End: 2022-05-10
Payer: COMMERCIAL

## 2022-05-10 DIAGNOSIS — U07.1 INFECTION DUE TO 2019 NOVEL CORONAVIRUS: Primary | ICD-10-CM

## 2022-05-10 PROCEDURE — 99213 OFFICE O/P EST LOW 20 MIN: CPT | Mod: 95 | Performed by: NURSE PRACTITIONER

## 2022-05-10 NOTE — PROGRESS NOTES
Maria G is a 47 year old who is being evaluated via a billable video visit.      How would you like to obtain your AVS? MyChart  If the video visit is dropped, the invitation should be resent by: Text to cell phone: [phone]  Will anyone else be joining your video visit? No      Video Start Time: 9:08 AM    Assessment & Plan     Infection due to 2019 novel coronavirus  Symptomatic treatment  Low risk for complications, would not jump to antiviral therapy      Magui Govea NP  [facility]    Subjective   Maria G is a 47 year old who presents for the following health issues: positive for COVID this morning on home test, c/o cough, scratchy throat, slight body aches, symptoms started Sunday morning, no known exposure, only other family member in the house is her     Onset of scratchy throat, nasal congestion, cough onset 2 days ago. Positive home COVID test this a.m.  No fever, no SOB  Has had 2 vaccines and booster  Seasonal allergies but no asthma or high risk status    Objective           Vitals:  No vitals were obtained today due to virtual visit.    Physical Exam   GENERAL: Healthy, alert and no distress  EYES: Eyes grossly normal to inspection.  No discharge or erythema, or obvious scleral/conjunctival abnormalities.  RESP: No audible wheeze, cough, or visible cyanosis.  No visible retractions or increased work of breathing.    SKIN: Visible skin clear. No significant rash, abnormal pigmentation or lesions.  NEURO: Cranial nerves grossly intact.  Mentation and speech appropriate for age.  PSYCH: Mentation appears normal, affect normal/bright, judgement and insight intact, normal speech and appearance well-groomed.                Video-Visit Details    Type of service:  Video Visit    Video End Time:9:14 AM    Originating Location (pt. Location): Home    Distant Location (provider location):  [facility]     Platform used for Video Visit: Memeoirs

## 2022-06-22 DIAGNOSIS — F33.42 RECURRENT MAJOR DEPRESSIVE DISORDER, IN FULL REMISSION (H): ICD-10-CM

## 2022-06-24 RX ORDER — SERTRALINE HYDROCHLORIDE 100 MG/1
100 TABLET, FILM COATED ORAL DAILY
Qty: 90 TABLET | Refills: 0 | Status: SHIPPED | OUTPATIENT
Start: 2022-06-24 | End: 2022-08-08

## 2022-06-24 NOTE — TELEPHONE ENCOUNTER
Medication is being filled for 1 time refill only due to:  PHQ9 due   Future appt 8/8/22  LALO Barry RN  [facility]

## 2022-06-28 DIAGNOSIS — F33.42 RECURRENT MAJOR DEPRESSIVE DISORDER, IN FULL REMISSION (H): ICD-10-CM

## 2022-06-30 RX ORDER — SERTRALINE HYDROCHLORIDE 100 MG/1
100 TABLET, FILM COATED ORAL DAILY
Qty: 90 TABLET | Refills: 0 | OUTPATIENT
Start: 2022-06-30

## 2022-07-18 DIAGNOSIS — Z30.41 ENCOUNTER FOR SURVEILLANCE OF CONTRACEPTIVE PILLS: ICD-10-CM

## 2022-07-20 RX ORDER — NORGESTIMATE AND ETHINYL ESTRADIOL 0.25-0.035
1 KIT ORAL DAILY
Qty: 84 TABLET | Refills: 0 | Status: SHIPPED | OUTPATIENT
Start: 2022-07-20 | End: 2022-08-08

## 2022-07-20 NOTE — TELEPHONE ENCOUNTER
---Prescription approved per [facility] Refill Protocol.       Jesenia Mercer RN BSN     [facility]          --Last visit: 6/24/21 Mercedes.    --Future Visit: 8/8/22 Mercedes for annual.

## 2022-08-04 ASSESSMENT — PATIENT HEALTH QUESTIONNAIRE - PHQ9
SUM OF ALL RESPONSES TO PHQ QUESTIONS 1-9: 2
10. IF YOU CHECKED OFF ANY PROBLEMS, HOW DIFFICULT HAVE THESE PROBLEMS MADE IT FOR YOU TO DO YOUR WORK, TAKE CARE OF THINGS AT HOME, OR GET ALONG WITH OTHER PEOPLE: NOT DIFFICULT AT ALL
SUM OF ALL RESPONSES TO PHQ QUESTIONS 1-9: 2

## 2022-08-08 ENCOUNTER — OFFICE VISIT (OUTPATIENT)
Dept: FAMILY MEDICINE | Facility: CLINIC | Age: 47
End: 2022-08-08
Payer: COMMERCIAL

## 2022-08-08 VITALS
WEIGHT: 136.2 LBS | DIASTOLIC BLOOD PRESSURE: 80 MMHG | OXYGEN SATURATION: 99 % | HEART RATE: 60 BPM | TEMPERATURE: 97.3 F | SYSTOLIC BLOOD PRESSURE: 122 MMHG | HEIGHT: 64 IN | RESPIRATION RATE: 20 BRPM | BODY MASS INDEX: 23.25 KG/M2

## 2022-08-08 DIAGNOSIS — Z30.41 ENCOUNTER FOR SURVEILLANCE OF CONTRACEPTIVE PILLS: ICD-10-CM

## 2022-08-08 DIAGNOSIS — Z00.00 ROUTINE GENERAL MEDICAL EXAMINATION AT A HEALTH CARE FACILITY: Primary | ICD-10-CM

## 2022-08-08 DIAGNOSIS — Z12.11 SCREEN FOR COLON CANCER: ICD-10-CM

## 2022-08-08 DIAGNOSIS — F33.42 RECURRENT MAJOR DEPRESSIVE DISORDER, IN FULL REMISSION (H): ICD-10-CM

## 2022-08-08 DIAGNOSIS — Z51.81 ENCOUNTER FOR THERAPEUTIC DRUG MONITORING: ICD-10-CM

## 2022-08-08 DIAGNOSIS — Z13.220 LIPID SCREENING: ICD-10-CM

## 2022-08-08 DIAGNOSIS — Z12.31 VISIT FOR SCREENING MAMMOGRAM: ICD-10-CM

## 2022-08-08 LAB
ERYTHROCYTE [DISTWIDTH] IN BLOOD BY AUTOMATED COUNT: 12.3 % (ref 10–15)
HCT VFR BLD AUTO: 34.6 % (ref 35–47)
HGB BLD-MCNC: 11.6 G/DL (ref 11.7–15.7)
MCH RBC QN AUTO: 32.1 PG (ref 26.5–33)
MCHC RBC AUTO-ENTMCNC: 33.5 G/DL (ref 31.5–36.5)
MCV RBC AUTO: 96 FL (ref 78–100)
PLATELET # BLD AUTO: 330 10E3/UL (ref 150–450)
RBC # BLD AUTO: 3.61 10E6/UL (ref 3.8–5.2)
WBC # BLD AUTO: 7 10E3/UL (ref 4–11)

## 2022-08-08 PROCEDURE — 96127 BRIEF EMOTIONAL/BEHAV ASSMT: CPT | Performed by: FAMILY MEDICINE

## 2022-08-08 PROCEDURE — 80061 LIPID PANEL: CPT | Performed by: FAMILY MEDICINE

## 2022-08-08 PROCEDURE — 99213 OFFICE O/P EST LOW 20 MIN: CPT | Mod: 25 | Performed by: FAMILY MEDICINE

## 2022-08-08 PROCEDURE — 99396 PREV VISIT EST AGE 40-64: CPT | Performed by: FAMILY MEDICINE

## 2022-08-08 PROCEDURE — 36415 COLL VENOUS BLD VENIPUNCTURE: CPT | Performed by: FAMILY MEDICINE

## 2022-08-08 PROCEDURE — 85027 COMPLETE CBC AUTOMATED: CPT | Performed by: FAMILY MEDICINE

## 2022-08-08 PROCEDURE — 80053 COMPREHEN METABOLIC PANEL: CPT | Performed by: FAMILY MEDICINE

## 2022-08-08 RX ORDER — SERTRALINE HYDROCHLORIDE 100 MG/1
100 TABLET, FILM COATED ORAL DAILY
Qty: 90 TABLET | Refills: 3 | Status: SHIPPED | OUTPATIENT
Start: 2022-08-08 | End: 2023-07-21

## 2022-08-08 RX ORDER — NORGESTIMATE AND ETHINYL ESTRADIOL 0.25-0.035
1 KIT ORAL DAILY
Qty: 84 TABLET | Refills: 3 | Status: SHIPPED | OUTPATIENT
Start: 2022-08-08 | End: 2022-10-13

## 2022-08-08 ASSESSMENT — ANXIETY QUESTIONNAIRES
GAD7 TOTAL SCORE: 2
6. BECOMING EASILY ANNOYED OR IRRITABLE: SEVERAL DAYS
1. FEELING NERVOUS, ANXIOUS, OR ON EDGE: SEVERAL DAYS
7. FEELING AFRAID AS IF SOMETHING AWFUL MIGHT HAPPEN: NOT AT ALL
5. BEING SO RESTLESS THAT IT IS HARD TO SIT STILL: NOT AT ALL
2. NOT BEING ABLE TO STOP OR CONTROL WORRYING: NOT AT ALL
7. FEELING AFRAID AS IF SOMETHING AWFUL MIGHT HAPPEN: NOT AT ALL
GAD7 TOTAL SCORE: 2
8. IF YOU CHECKED OFF ANY PROBLEMS, HOW DIFFICULT HAVE THESE MADE IT FOR YOU TO DO YOUR WORK, TAKE CARE OF THINGS AT HOME, OR GET ALONG WITH OTHER PEOPLE?: NOT DIFFICULT AT ALL
3. WORRYING TOO MUCH ABOUT DIFFERENT THINGS: NOT AT ALL
IF YOU CHECKED OFF ANY PROBLEMS ON THIS QUESTIONNAIRE, HOW DIFFICULT HAVE THESE PROBLEMS MADE IT FOR YOU TO DO YOUR WORK, TAKE CARE OF THINGS AT HOME, OR GET ALONG WITH OTHER PEOPLE: NOT DIFFICULT AT ALL
4. TROUBLE RELAXING: NOT AT ALL

## 2022-08-08 ASSESSMENT — PATIENT HEALTH QUESTIONNAIRE - PHQ9
10. IF YOU CHECKED OFF ANY PROBLEMS, HOW DIFFICULT HAVE THESE PROBLEMS MADE IT FOR YOU TO DO YOUR WORK, TAKE CARE OF THINGS AT HOME, OR GET ALONG WITH OTHER PEOPLE: NOT DIFFICULT AT ALL
SUM OF ALL RESPONSES TO PHQ QUESTIONS 1-9: 2

## 2022-08-08 NOTE — PROGRESS NOTES
SUBJECTIVE:   CC: Maria G Khan is an 47 year old woman who presents for preventive health visit.       Patient has been advised of split billing requirements and indicates understanding: Yes  Healthy Habits:     Taking medications regularly:  0    PHQ-2 Total Score: 0  History of Present Illness       Reason for visit:  Annual checkup and medication refill (oral contraceptives; sertraline HCl)    She eats 2-3 servings of fruits and vegetables daily.She consumes 2 sweetened beverage(s) daily.She exercises with enough effort to increase her heart rate 60 or more minutes per day.  She exercises with enough effort to increase her heart rate 4 days per week.   She is taking medications regularly.    Today's PHQ-9         PHQ-9 Total Score: 2    PHQ-9 Q9 Thoughts of better off dead/self-harm past 2 weeks :   Not at all    How difficult have these problems made it for you to do your work, take care of things at home, or get along with other people: Not difficult at all  Today's KYRA-7 Score: 2    No history of migraines.   No history of DVT/PE.   No smoking.  Needs refill for birth control.     Today's PHQ-2 Score:   PHQ-2 ( 1999 Pfizer) 9/2/2021   Q1: Little interest or pleasure in doing things 0   Q2: Feeling down, depressed or hopeless 0   PHQ-2 Score 0   PHQ-2 Total Score (12-17 Years)- Positive if 3 or more points; Administer PHQ-A if positive 0       Abuse: Current or Past (Physical, Sexual or Emotional) - No  Do you feel safe in your environment? Yes        Social History     Tobacco Use     Smoking status: Never Smoker     Smokeless tobacco: Never Used   Substance Use Topics     Alcohol use: Yes     If you drink alcohol do you typically have >3 drinks per day or >7 drinks per week? No        Reviewed orders with patient.  Reviewed health maintenance and updated orders accordingly - Yes  Labs reviewed in EPIC    Breast Cancer Screening:    FHS-7:   Breast CA Risk Assessment (FHS-7) 7/29/2021   Did any of your  "first-degree relatives have breast or ovarian cancer? Yes   Did any of your relatives have bilateral breast cancer? No   Did any man in your family have breast cancer? No   Did any woman in your family have breast and ovarian cancer? No   Did any woman in your family have breast cancer before age 50 y? Yes   Do you have 2 or more relatives with breast and/or ovarian cancer? No   Do you have 2 or more relatives with breast and/or bowel cancer? No       Pertinent mammograms are reviewed under the imaging tab.    History of abnormal Pap smear: NO - age 30- 65 PAP every 3 years recommended  PAP / HPV Latest Ref Rng & Units 7/7/2020   PAP (Historical) - NIL   HPV16 NEG:Negative Negative   HPV18 NEG:Negative Negative   HRHPV NEG:Negative Negative     Reviewed and updated as needed this visit by clinical staff                    Reviewed and updated as needed this visit by Provider                       Review of Systems       OBJECTIVE:   Physical Exam   /80 (BP Location: Right arm, Patient Position: Sitting, Cuff Size: Adult Regular)   Pulse 60   Temp 97.3  F (36.3  C) (Temporal)   Resp 20   Ht 1.626 m (5' 4\")   Wt 61.8 kg (136 lb 3.2 oz)   LMP 08/07/2022   SpO2 99%   BMI 23.38 kg/m    GENERAL: healthy, alert and no distress  EYES: Eyes grossly normal to inspection, conjunctivae and sclerae normal  HENT: ear canals and TM's normal  NECK: no adenopathy, no asymmetry, masses, or scars and thyroid normal to palpation  RESP: lungs clear to auscultation - no rales, rhonchi or wheezes  BREAST: normal without masses, tenderness or nipple discharge and no palpable axillary masses or adenopathy  CV: regular rate and rhythm, normal S1 S2,  ABDOMEN: soft, nontender, no hepatosplenomegaly, no masses and bowel sounds normal  MS: no gross musculoskeletal defects noted, no edema  SKIN: no suspicious lesions or rashes  NEURO: Normal strength and tone, mentation intact and speech normal  PSYCH: mentation appears normal, " "affect normal/bright    Diagnostic Test Results:  Labs reviewed in Epic    ASSESSMENT/PLAN:     Routine general medical examination at a health care facility  - Up to date with tetanus - ~ 2013    - up to date with pap smear, due 7/2023  - followed by dermatology    Screen for colon cancer  - PEPE([employer])    Recurrent major depressive disorder, in full remission (H)  -   PHQ 1/6/2021 6/24/2021 8/4/2022   PHQ-9 Total Score 4 0 2   Q9: Thoughts of better off dead/self-harm past 2 weeks Not at all Not at all Not at all   - stable, refill below  - sertraline (ZOLOFT) 100 MG tablet; Take 1 tablet (100 mg) by mouth daily  Dispense: 90 tablet; Refill: 3    Visit for screening mammogram  - MA SCREENING DIGITAL BILAT - Future  (s+30); Future    Encounter for surveillance of contraceptive pills  No history of migraines.   No history of DVT/PE.   No smoking.  - norgestimate-ethinyl estradiol (ORTHO-CYCLEN) 0.25-35 MG-MCG tablet; Take 1 tablet by mouth daily  Dispense: 84 tablet; Refill: 3    Encounter for therapeutic drug monitoring  - CBC with platelets; Future  - Comprehensive metabolic panel (BMP + Alb, Alk Phos, ALT, AST, Total. Bili, TP); Future  - CBC with platelets  - Comprehensive metabolic panel (BMP + Alb, Alk Phos, ALT, AST, Total. Bili, TP)    Lipid screening  - Lipid Profile (Chol, Trig, HDL, LDL calc); Future  - Lipid Profile (Chol, Trig, HDL, LDL calc)        Patient has been advised of split billing requirements and indicates understanding: Yes    COUNSELING:  Reviewed preventive health counseling, as reflected in patient instructions    Estimated body mass index is 22.48 kg/m  as calculated from the following:    Height as of 6/24/21: 1.638 m (5' 4.5\").    Weight as of 5/8/22: 60.3 kg (133 lb).        She reports that she has never smoked. She has never used smokeless tobacco.      Counseling Resources:  ATP IV Guidelines  Pooled Cohorts Equation Calculator  Breast Cancer Risk " Calculator  BRCA-Related Cancer Risk Assessment: FHS-7 Tool  FRAX Risk Assessment  ICSI Preventive Guidelines  Dietary Guidelines for Americans, 2010  USDA's MyPlate  ASA Prophylaxis  Lung CA Screening    Patricia Long MD  [facility]

## 2022-08-11 LAB
ALBUMIN SERPL-MCNC: 3.6 G/DL (ref 3.4–5)
ALP SERPL-CCNC: 42 U/L (ref 40–150)
ALT SERPL W P-5'-P-CCNC: 22 U/L (ref 0–50)
ANION GAP SERPL CALCULATED.3IONS-SCNC: 6 MMOL/L (ref 3–14)
AST SERPL W P-5'-P-CCNC: 24 U/L (ref 0–45)
BILIRUB SERPL-MCNC: 0.2 MG/DL (ref 0.2–1.3)
BUN SERPL-MCNC: 11 MG/DL (ref 7–30)
CALCIUM SERPL-MCNC: 9 MG/DL (ref 8.5–10.1)
CHLORIDE BLD-SCNC: 108 MMOL/L (ref 94–109)
CHOLEST SERPL-MCNC: 278 MG/DL
CO2 SERPL-SCNC: 25 MMOL/L (ref 20–32)
CREAT SERPL-MCNC: 0.72 MG/DL (ref 0.52–1.04)
FASTING STATUS PATIENT QL REPORTED: YES
GFR SERPL CREATININE-BSD FRML MDRD: >90 ML/MIN/1.73M2
GLUCOSE BLD-MCNC: 82 MG/DL (ref 70–99)
HDLC SERPL-MCNC: 72 MG/DL
LDLC SERPL CALC-MCNC: 177 MG/DL
NONHDLC SERPL-MCNC: 206 MG/DL
POTASSIUM BLD-SCNC: 4 MMOL/L (ref 3.4–5.3)
PROT SERPL-MCNC: 7.2 G/DL (ref 6.8–8.8)
SODIUM SERPL-SCNC: 139 MMOL/L (ref 133–144)
TRIGL SERPL-MCNC: 145 MG/DL

## 2022-08-26 LAB — NONINV COLON CA DNA+OCC BLD SCRN STL QL: NEGATIVE

## 2022-09-24 ENCOUNTER — HEALTH MAINTENANCE LETTER (OUTPATIENT)
Age: 47
End: 2022-09-24

## 2022-09-29 ENCOUNTER — IMMUNIZATION (OUTPATIENT)
Dept: FAMILY MEDICINE | Facility: CLINIC | Age: 47
End: 2022-09-29
Payer: COMMERCIAL

## 2022-09-29 DIAGNOSIS — Z23 NEED FOR PROPHYLACTIC VACCINATION AND INOCULATION AGAINST INFLUENZA: ICD-10-CM

## 2022-09-29 DIAGNOSIS — Z23 HIGH PRIORITY FOR 2019-NCOV VACCINE: ICD-10-CM

## 2022-09-29 DIAGNOSIS — Z00.00 HEALTH CARE MAINTENANCE: Primary | ICD-10-CM

## 2022-09-29 PROCEDURE — 0124A COVID-19,PF,PFIZER BOOSTER BIVALENT: CPT

## 2022-09-29 PROCEDURE — 99207 PR NO CHARGE NURSE ONLY: CPT

## 2022-09-29 PROCEDURE — 91312 COVID-19,PF,PFIZER BOOSTER BIVALENT: CPT

## 2022-10-12 ENCOUNTER — MYC MEDICAL ADVICE (OUTPATIENT)
Dept: FAMILY MEDICINE | Facility: CLINIC | Age: 47
End: 2022-10-12

## 2022-10-12 DIAGNOSIS — Z30.41 ENCOUNTER FOR SURVEILLANCE OF CONTRACEPTIVE PILLS: ICD-10-CM

## 2022-10-13 RX ORDER — NORGESTIMATE AND ETHINYL ESTRADIOL 0.25-0.035
1 KIT ORAL DAILY
Qty: 84 TABLET | Refills: 2 | Status: SHIPPED | OUTPATIENT
Start: 2022-10-13 | End: 2023-05-31

## 2022-10-13 NOTE — TELEPHONE ENCOUNTER
OCP resent.    Writer responded via ToonTime.    MECHELLE DavisN, RN-BC  [facility]

## 2022-10-14 ENCOUNTER — ANCILLARY PROCEDURE (OUTPATIENT)
Dept: MAMMOGRAPHY | Facility: CLINIC | Age: 47
End: 2022-10-14
Attending: FAMILY MEDICINE
Payer: COMMERCIAL

## 2022-10-14 DIAGNOSIS — Z12.31 VISIT FOR SCREENING MAMMOGRAM: ICD-10-CM

## 2022-10-14 PROCEDURE — 77063 BREAST TOMOSYNTHESIS BI: CPT | Mod: GC

## 2022-10-14 PROCEDURE — 77067 SCR MAMMO BI INCL CAD: CPT | Mod: GC

## 2023-01-17 ENCOUNTER — OFFICE VISIT (OUTPATIENT)
Dept: OPTOMETRY | Facility: CLINIC | Age: 48
End: 2023-01-17
Payer: COMMERCIAL

## 2023-01-17 DIAGNOSIS — H52.4 MYOPIA OF BOTH EYES WITH ASTIGMATISM AND PRESBYOPIA: Primary | ICD-10-CM

## 2023-01-17 DIAGNOSIS — H52.203 MYOPIA OF BOTH EYES WITH ASTIGMATISM AND PRESBYOPIA: Primary | ICD-10-CM

## 2023-01-17 DIAGNOSIS — H52.13 MYOPIA OF BOTH EYES WITH ASTIGMATISM AND PRESBYOPIA: Primary | ICD-10-CM

## 2023-01-17 ASSESSMENT — EXTERNAL EXAM - RIGHT EYE: OD_EXAM: NORMAL

## 2023-01-17 ASSESSMENT — TONOMETRY
OS_IOP_MMHG: 16
OD_IOP_MMHG: 19
IOP_METHOD: ICARE

## 2023-01-17 ASSESSMENT — CONF VISUAL FIELD
OD_SUPERIOR_NASAL_RESTRICTION: 0
OD_INFERIOR_TEMPORAL_RESTRICTION: 0
OS_SUPERIOR_TEMPORAL_RESTRICTION: 0
METHOD: COUNTING FINGERS
OS_INFERIOR_TEMPORAL_RESTRICTION: 0
OS_NORMAL: 1
OD_SUPERIOR_TEMPORAL_RESTRICTION: 0
OS_SUPERIOR_NASAL_RESTRICTION: 0
OS_INFERIOR_NASAL_RESTRICTION: 0
OD_INFERIOR_NASAL_RESTRICTION: 0
OD_NORMAL: 1

## 2023-01-17 ASSESSMENT — REFRACTION_MANIFEST
OS_CYLINDER: +1.25
OD_ADD: +1.75
OS_SPHERE: -3.75
OS_ADD: +1.75
OD_SPHERE: -3.50
OD_CYLINDER: +1.00
OS_AXIS: 060
OD_AXIS: 085

## 2023-01-17 ASSESSMENT — REFRACTION_WEARINGRX
OD_SPHERE: -3.50
OS_AXIS: 066
OD_CYLINDER: +1.25
OD_AXIS: 100
OS_CYLINDER: +1.25
OS_SPHERE: -3.75

## 2023-01-17 ASSESSMENT — SLIT LAMP EXAM - LIDS
COMMENTS: NORMAL
COMMENTS: NORMAL

## 2023-01-17 ASSESSMENT — CUP TO DISC RATIO
OS_RATIO: 0.35
OD_RATIO: 0.35

## 2023-01-17 ASSESSMENT — VISUAL ACUITY
OD_CC: 20/20
METHOD: SNELLEN - LINEAR
OS_CC: 20/20
CORRECTION_TYPE: GLASSES

## 2023-01-17 ASSESSMENT — EXTERNAL EXAM - LEFT EYE: OS_EXAM: NORMAL

## 2023-01-17 NOTE — PROGRESS NOTES
A/P  1.) Myopia/Astig/Presbyopia OU  -Doing well in current Rx. Symptomatic for presbyopia, needs add at this time  -Reviewed PAL and adaptation  -Dilated ocular health unremarkable OU    Monitor 1-2 years comprehensive, sooner prn    I have confirmed the patient's CC, HPI and reviewed Past Medical History, Past Surgical History, Social History, Family History, Problem List, Medication List and agree with Tech note.     Salma Oro, OD FAAO [facility]

## 2023-02-27 ENCOUNTER — MYC MEDICAL ADVICE (OUTPATIENT)
Dept: FAMILY MEDICINE | Facility: CLINIC | Age: 48
End: 2023-02-27
Payer: COMMERCIAL

## 2023-02-27 DIAGNOSIS — U07.1 INFECTION DUE TO 2019 NOVEL CORONAVIRUS: Primary | ICD-10-CM

## 2023-02-27 NOTE — TELEPHONE ENCOUNTER
RN COVID TREATMENT VISIT  02/27/23      The patient has been triaged and does not require a higher level of care.    Maria G Khan  47 year old  Current weight? 130lb    Has the patient been seen by a primary care provider at an [facility] or [facility] within the past two years? Yes.   Have you been in close proximity to/do you have a known exposure to a person with a confirmed case of influenza? No.     General treatment eligibility:  Date of positive COVID test (PCR or at home)?  2/27/23    Are you or have you been hospitalized for this COVID-19 infection? No.   Have you received monoclonal antibodies or antiviral treatment for COVID-19 since this positive test? No.   Do you have any of the following conditions that place you at risk of being very sick from COVID-19?   - Mental health disorders including mood disorders, depression, schizophrenia spectrum disorders   Yes, patient has at least one high risk condition as noted above.     Current COVID symptoms:   - cough  - fatigue  - muscle or body aches  - headache  - sore throat  Yes. Patient has at least one symptom as selected.     How many days since symptoms started? 5 days or less. Established patient, 12 years or older weighing at least 88.2 lbs, who has symptoms that started in the past 5 days, has not been hospitalized nor received treatment already, and is at risk for being very sick from COVID-19.     Treatment eligibility by RN:    Are you currently pregnant or nursing? No    Do you have a clinically significant hypersensitivity to nirmatrelvir or ritonavir, or toxic epidermal necrolysis (TEN) or Matta-Cesar Syndrome? No    Do you have a history of hepatitis, any hepatic impairment on the Problem List (such as Child-Bah Class C, cirrhosis, fatty liver disease, alcoholic liver disease), or was the last liver lab (hepatic panel, ALT, AST, ALK Phos, bilirubin) elevated in the past 6 months? No    Do you have any  history of severe renal impairment (eGFR < 30mL/min)? No    Is patient eligible to continue?   Yes, patient meets all eligibility requirements for the RN COVID treatment (as denoted by all no responses above).     Current Outpatient Medications   Medication Sig Dispense Refill     cyclobenzaprine (FLEXERIL) 5 MG tablet Take 1-2 tablets (5-10 mg) by mouth 3 times daily as needed for muscle spasms (Patient not taking: Reported on 8/8/2022) 30 tablet 0     fexofenadine (ALLEGRA) 180 MG tablet Take 180 mg by mouth daily       Multiple Vitamins-Minerals (MULTIVITAMIN ADULT PO)        norgestimate-ethinyl estradiol (ORTHO-CYCLEN) 0.25-35 MG-MCG tablet Take 1 tablet by mouth daily 84 tablet 2     sertraline (ZOLOFT) 100 MG tablet Take 1 tablet (100 mg) by mouth daily 90 tablet 3     triamcinolone (KENALOG) 0.1 % external cream Apply topically 2 times daily To itchy area on R arm 30 g 1       Medications from List 1 of the standing order (on medications that exclude the use of Paxlovid) that patient is taking: NONE. Is patient taking [place]'s Wort? No  Is patient taking [place]'s Wort or any meds from List 1? No.   Medications from List 2 of the standing order (on meds that provider needs to adjust) that patient is taking: NONE. Is patient on any of the meds from List 2? No.   Medications from List 3 of standing order (on meds that a RN needs to adjust) that patient is taking: NONE. Is patient on any meds from List 3? No.     Paxlovid has an approximate 90% reduction in hospitalization. Paxlovid can possibly cause altered sense of taste, diarrhea (loose, watery stools), high blood pressure, muscle aches.     Would patient like a Paxlovid prescription?   Yes.   Lab Results   Component Value Date    GFRESTIMATED >90 08/08/2022       Was last eGFR reduced? No, eGFR 60 or greater/ No Result on record. Patient can receive the normal renal function dose. Paxlovid Rx sent to [place] [facility]   [facility]    Temporary change to  home medications: None    All medication adjustments (holds, etc) were discussed with the patient and patient was asked to repeat back (teachback) their med adjustment.  Did patient understand med adjustment? Yes, patient repeated back and understood correctly.        Reviewed the following instructions with the patient:    Paxlovid (nimatrelvir and ritonavir)    How it works  Two medicines (nirmatrelvir and ritonavir) are taken together. They stop the virus from growing. Less amount of virus is easier for your body to fight.    How to take    Medicine comes in a daily container with both medicine tablets. Take by mouth twice daily (once in the morning, once at night) for 5 days.    The number of tablets to take varies by patient.    Don't chew or break capsules. Swallow whole.    When to take  Take as soon as possible after positive COVID-19 test result, and within 5 days of your first symptoms.    Possible side effects  Can cause altered sense of taste, diarrhea (loose, watery stools), high blood pressure, muscle aches.    Charito Membreno RN

## 2023-02-27 NOTE — TELEPHONE ENCOUNTER
Writer responded via [employer].    MECHELLE DavisN, RN-BC  [facility]

## 2023-03-07 ENCOUNTER — TELEPHONE (OUTPATIENT)
Dept: DERMATOLOGY | Facility: CLINIC | Age: 48
End: 2023-03-07
Payer: COMMERCIAL

## 2023-05-18 ENCOUNTER — OFFICE VISIT (OUTPATIENT)
Dept: URGENT CARE | Facility: URGENT CARE | Age: 48
End: 2023-05-18
Payer: COMMERCIAL

## 2023-05-18 VITALS
HEART RATE: 64 BPM | DIASTOLIC BLOOD PRESSURE: 75 MMHG | TEMPERATURE: 97.6 F | OXYGEN SATURATION: 98 % | SYSTOLIC BLOOD PRESSURE: 141 MMHG

## 2023-05-18 DIAGNOSIS — H69.91 DYSFUNCTION OF RIGHT EUSTACHIAN TUBE: ICD-10-CM

## 2023-05-18 DIAGNOSIS — R42 VERTIGO: Primary | ICD-10-CM

## 2023-05-18 PROCEDURE — 99213 OFFICE O/P EST LOW 20 MIN: CPT

## 2023-05-18 RX ORDER — MECLIZINE HYDROCHLORIDE 25 MG/1
25 TABLET ORAL 3 TIMES DAILY PRN
Qty: 25 TABLET | Refills: 0 | Status: SHIPPED | OUTPATIENT
Start: 2023-05-18 | End: 2023-05-24

## 2023-05-18 RX ORDER — ONDANSETRON 4 MG/1
4 TABLET, ORALLY DISINTEGRATING ORAL EVERY 8 HOURS PRN
Qty: 25 TABLET | Refills: 0 | Status: SHIPPED | OUTPATIENT
Start: 2023-05-18 | End: 2023-12-03

## 2023-05-18 ASSESSMENT — ENCOUNTER SYMPTOMS
SORE THROAT: 0
FEVER: 0
COUGH: 0
VOMITING: 1
DIZZINESS: 1
NAUSEA: 1

## 2023-05-19 NOTE — PROGRESS NOTES
SUBJECTIVE:   Maria G Khan is a 48 year old female presenting with a chief complaint of   Chief Complaint   Patient presents with     Dizziness     Since this morning, no ear pain, feels like ear pressure, vomiting and nausea today        She is an established patient of [facility].  Patient presents with complaints of right ear pressure, nausea and vomiting x 5 today.  Patient has had vertigo in past, a few incidents.  No problems with ears known.      Treatment:  Sudafed and benadryl.      Review of Systems   Constitutional: Negative for fever.   HENT: Positive for congestion and hearing loss. Negative for sore throat.    Respiratory: Negative for cough.    Gastrointestinal: Positive for nausea and vomiting.   Neurological: Positive for dizziness.   All other systems reviewed and are negative.      Past Medical History:   Diagnosis Date     MDD (major depressive disorder)      Seasonal allergies      Family History   Problem Relation Age of Onset     Breast Cancer Mother      Hypertension Father      Heart Failure Maternal Grandmother      Cerebrovascular Disease Paternal Grandmother      Dementia Other      Current Outpatient Medications   Medication Sig Dispense Refill     aspirin (ASA) 81 MG EC tablet Take 1 tablet (81 mg) by mouth daily 0.1 tablet 0     fexofenadine (ALLEGRA) 180 MG tablet Take 180 mg by mouth daily       meclizine (ANTIVERT) 25 MG tablet Take 1 tablet (25 mg) by mouth 3 times daily as needed for dizziness 25 tablet 0     Multiple Vitamins-Minerals (MULTIVITAMIN ADULT PO)        norgestimate-ethinyl estradiol (ORTHO-CYCLEN) 0.25-35 MG-MCG tablet Take 1 tablet by mouth daily 84 tablet 2     ondansetron (ZOFRAN ODT) 4 MG ODT tab Take 1 tablet (4 mg) by mouth every 8 hours as needed for nausea 25 tablet 0     sertraline (ZOLOFT) 100 MG tablet Take 1 tablet (100 mg) by mouth daily 90 tablet 3     cyclobenzaprine (FLEXERIL) 5 MG tablet Take 1-2 tablets (5-10 mg) by mouth 3 times daily as needed  for muscle spasms (Patient not taking: Reported on 8/8/2022) 30 tablet 0     triamcinolone (KENALOG) 0.1 % external cream Apply topically 2 times daily To itchy area on R arm (Patient not taking: Reported on 5/18/2023) 30 g 1     Social History     Tobacco Use     Smoking status: Never     Smokeless tobacco: Never   Vaping Use     Vaping status: Not on file   Substance Use Topics     Alcohol use: Yes       OBJECTIVE  BP (!) 141/75 (BP Location: Left arm, Patient Position: Sitting, Cuff Size: Adult Large)   Pulse 64   Temp 97.6  F (36.4  C) (Tympanic)   LMP 05/14/2023 (Exact Date)   SpO2 98%     Physical Exam  Vitals and nursing note reviewed.   Constitutional:       Appearance: Normal appearance. She is normal weight.   HENT:      Head: Normocephalic and atraumatic.      Right Ear: Tympanic membrane, ear canal and external ear normal.      Left Ear: Tympanic membrane, ear canal and external ear normal.      Ears:      Comments: Right TM normal with clear fluid behind TM.     Nose: Nose normal.      Mouth/Throat:      Mouth: Mucous membranes are moist.      Pharynx: Oropharynx is clear.   Eyes:      Extraocular Movements: Extraocular movements intact.      Conjunctiva/sclera: Conjunctivae normal.   Cardiovascular:      Rate and Rhythm: Normal rate and regular rhythm.      Pulses: Normal pulses.      Heart sounds: Normal heart sounds.   Pulmonary:      Effort: Pulmonary effort is normal.      Breath sounds: Normal breath sounds.   Musculoskeletal:      Cervical back: Normal range of motion.   Skin:     General: Skin is warm and dry.      Findings: No rash.   Neurological:      General: No focal deficit present.      Mental Status: She is alert.   Psychiatric:         Mood and Affect: Mood normal.         Behavior: Behavior normal.         Labs:  No results found for this or any previous visit (from the past 24 hour(s)).    X-Ray was not done.    ASSESSMENT:      ICD-10-CM    1. Vertigo  R42 ondansetron (ZOFRAN  ODT) 4 MG ODT tab     meclizine (ANTIVERT) 25 MG tablet      2. Dysfunction of right eustachian tube  H69.81            Medical Decision Making:    Differential Diagnosis:  Vertigo, OM, eustation tube dysfunction.    Serious Comorbid Conditions:  Adult:  reviewed    PLAN:    Rx for antivert and zofran.  Patient education.  Discussed reasons to seek immediate medical attention.  Additionally if no improvement or worsening in one week, may follow up with PCP and/or UC.        Followup:    If not improving or if condition worsens, follow up with your Primary Care Provider, If not improving or if conditions worsens over the next 12-24 hours, go to the Emergency Department    There are no Patient Instructions on file for this visit.

## 2023-05-21 ENCOUNTER — MYC MEDICAL ADVICE (OUTPATIENT)
Dept: FAMILY MEDICINE | Facility: CLINIC | Age: 48
End: 2023-05-21
Payer: COMMERCIAL

## 2023-05-23 ASSESSMENT — PATIENT HEALTH QUESTIONNAIRE - PHQ9
SUM OF ALL RESPONSES TO PHQ QUESTIONS 1-9: 2
10. IF YOU CHECKED OFF ANY PROBLEMS, HOW DIFFICULT HAVE THESE PROBLEMS MADE IT FOR YOU TO DO YOUR WORK, TAKE CARE OF THINGS AT HOME, OR GET ALONG WITH OTHER PEOPLE: SOMEWHAT DIFFICULT
SUM OF ALL RESPONSES TO PHQ QUESTIONS 1-9: 2

## 2023-05-23 NOTE — TELEPHONE ENCOUNTER
See 5/22/23 telephone encounter.  MECHELLE DavisN, RN-BC  [facility]

## 2023-05-24 ENCOUNTER — OFFICE VISIT (OUTPATIENT)
Dept: FAMILY MEDICINE | Facility: CLINIC | Age: 48
End: 2023-05-24
Payer: COMMERCIAL

## 2023-05-24 VITALS
HEIGHT: 64 IN | HEART RATE: 67 BPM | OXYGEN SATURATION: 97 % | RESPIRATION RATE: 16 BRPM | TEMPERATURE: 97.3 F | WEIGHT: 133 LBS | SYSTOLIC BLOOD PRESSURE: 137 MMHG | DIASTOLIC BLOOD PRESSURE: 87 MMHG | BODY MASS INDEX: 22.71 KG/M2

## 2023-05-24 DIAGNOSIS — H69.91 ACUTE DYSFUNCTION OF RIGHT EUSTACHIAN TUBE: ICD-10-CM

## 2023-05-24 DIAGNOSIS — H90.5 SENSORINEURAL HEARING LOSS (SNHL) OF RIGHT EAR, UNSPECIFIED HEARING STATUS ON CONTRALATERAL SIDE: ICD-10-CM

## 2023-05-24 DIAGNOSIS — R42 VERTIGO: ICD-10-CM

## 2023-05-24 PROCEDURE — 99207 E-CONSULT TO ENT (ADULT OUTPT PROVIDER TO SPECIALIST WRITTEN QUESTION & RESPONSE): CPT | Performed by: FAMILY MEDICINE

## 2023-05-24 PROCEDURE — 99214 OFFICE O/P EST MOD 30 MIN: CPT | Performed by: FAMILY MEDICINE

## 2023-05-24 RX ORDER — MECLIZINE HYDROCHLORIDE 25 MG/1
25 TABLET ORAL 3 TIMES DAILY PRN
Qty: 25 TABLET | Refills: 0 | Status: SHIPPED | OUTPATIENT
Start: 2023-05-24 | End: 2023-12-03

## 2023-05-24 ASSESSMENT — PAIN SCALES - GENERAL: PAINLEVEL: NO PAIN (0)

## 2023-05-24 NOTE — PATIENT INSTRUCTIONS
Restart allegra   Call me if your symptoms are not improving by 5/26/23. We will try short burst of steroid (3-5 days).  If your symptoms continue to not improve then next week I will do e-consult for ENT.

## 2023-05-24 NOTE — PROGRESS NOTES
Assessment & Plan     Vertigo  Acute dysfunction of right eustachian tube  - Advised below  Restart allegra   Call me if your symptoms are not improving by 5/26/23. We will try short burst of steroid (3-5 days).  If your symptoms continue to not improve then next week I will do e-consult for ENT.   - meclizine (ANTIVERT) 25 MG tablet; Take 1 tablet (25 mg) by mouth 3 times daily as needed for dizziness  Dispense: 25 tablet; Refill: 0  - Adult E-Consult to ENT (Outpt Provider to Specialist Written Question & Response)      Clinical Question/Purpose: MY CLINICAL QUESTION IS: Treatment options for ongoing dysfunction of right eustachian tube and vertigo.     Patient assessment and information reviewed: chart notes     Recommendations: Patient needs urgent referral for possible sudden sensorineural hearing loss. Please re-enter as urgent referral.  Needs audiogram    Patricia Long MD  [facility]    Ramiro Cummins is a 48 year old, presenting for the following health issues:  Urgent Care F/U        5/24/2023     1:46 PM   Additional Questions   Roomed by Seble MALAVE     HPI     5/17/23 symptoms started. Wednesday she couldn't hear out of her right ear and slight headache. Thursday she felt nauseous and was seen at the ER. Since then there has been swelling near the outside of her right eye and right eye is watery. She has been taking zofran PRN (2 doses since  visit). She is taking meclizine TID, sudafed TID, Flonase and benadryl at night. Sometimes she feels that the room is spinning and sometimes light headiness. Dizziness has been pretty constant. She feels that her balance is off when walking hold onto objects for support. She also has pain behind the right ear. On Thursday before  she was vomiting, none since. She has rhinorrhea. No vision changes, ear pain, nasal congestion, sore throat or cough. On Thursday she had negative COVID test. End of Feb 2023 - COVID infection.  She is staying hydrated. No falls. No worsening symptoms on changing head position. No palpations. She will restart Allegra.       Review of Systems         Objective    LMP 05/14/2023 (Exact Date)   There is no height or weight on file to calculate BMI.  Physical Exam   GENERAL: healthy, alert and no distress  HENT: ear canals and TM's normal, nose and mouth without ulcers or lesions  NECK: no adenopathy  RESP: lungs clear to auscultation - no rales, rhonchi or wheezes  CV: regular rate and rhythm, normal S1 S2                    Answers for HPI/ROS submitted by the patient on 5/23/2023  If you checked off any problems, how difficult have these problems made it for you to do your work, take care of things at home, or get along with other people?: Somewhat difficult  PHQ9 TOTAL SCORE: 2

## 2023-05-26 ENCOUNTER — TELEPHONE (OUTPATIENT)
Dept: FAMILY MEDICINE | Facility: CLINIC | Age: 48
End: 2023-05-26
Payer: COMMERCIAL

## 2023-05-26 DIAGNOSIS — H69.91 ACUTE DYSFUNCTION OF RIGHT EUSTACHIAN TUBE: Primary | ICD-10-CM

## 2023-05-26 NOTE — TELEPHONE ENCOUNTER
Medication Question or Refill    Contacts       Type Contact Phone/Fax    05/26/2023 02:16 PM CDT Phone (Incoming) Maria G Khan (Self) [phone] (M)          What medication are you calling about (include dose and sig)?: Prednisone     Preferred Pharmacy:    [id number] [address] AT [address] [address]  Phone: [phone] Fax: [phone]      Controlled Substance Agreement on file:   CSA -- Patient Level:    CSA: None found at the patient level.       Who prescribed the medication?: N/A    Do you need a refill? No    When did you use the medication last? N/A    Patient offered an appointment? No    Do you have any questions or concerns?  Yes: Has decided to move forward with the medication. States PCP advised to reach out if decided to try med.      Could we send this information to you in [place] or would you prefer to receive a phone call?:   No preference   Okay to leave a detailed message?: Yes at Home number on file [phone] (home)

## 2023-05-26 NOTE — TELEPHONE ENCOUNTER
Dr. Long --    Patient would like to go ahead with your suggestion for the prednisone.     Preferred Pharmacy:     [facility] #[id number] [address] AT [address] [address]  Phone: [phone] Fax: [phone]     LAURA Solomon RN  [facility]

## 2023-05-27 RX ORDER — PREDNISONE 10 MG/1
10 TABLET ORAL DAILY
Qty: 5 TABLET | Refills: 0 | Status: SHIPPED | OUTPATIENT
Start: 2023-05-27 | End: 2023-06-01

## 2023-05-30 DIAGNOSIS — Z30.41 ENCOUNTER FOR SURVEILLANCE OF CONTRACEPTIVE PILLS: ICD-10-CM

## 2023-05-31 RX ORDER — NORGESTIMATE AND ETHINYL ESTRADIOL 0.25-0.035
1 KIT ORAL DAILY
Qty: 84 TABLET | Refills: 2 | Status: SHIPPED | OUTPATIENT
Start: 2023-05-31 | End: 2023-09-11

## 2023-05-31 NOTE — TELEPHONE ENCOUNTER
Prescription approved per [facility] Refill Protocol.    Ros Acosta, BSN RN  [facility]

## 2023-06-01 ENCOUNTER — TELEPHONE (OUTPATIENT)
Dept: FAMILY MEDICINE | Facility: CLINIC | Age: 48
End: 2023-06-01
Payer: COMMERCIAL

## 2023-06-01 NOTE — TELEPHONE ENCOUNTER
"Dr. Long --    Please review and advise.  E-visit with ENT.     Patient called with the following information:   -- still cannot hear out of right ear  -- no pain in right ear  -- vertigo: \"not as bad as was but still wobbly\"  -- nausea is gone  -- prednisone: took last pill this morning    Patient would like to proceed with Dr. Long doing the e-visit with ENT that was offered.     MECHELLE SolomonN ESTRELLA  [facility]  "

## 2023-06-05 ENCOUNTER — TELEPHONE (OUTPATIENT)
Dept: OTOLARYNGOLOGY | Facility: CLINIC | Age: 48
End: 2023-06-05

## 2023-06-05 ENCOUNTER — E-CONSULT (OUTPATIENT)
Dept: OTOLARYNGOLOGY | Facility: CLINIC | Age: 48
End: 2023-06-05
Payer: COMMERCIAL

## 2023-06-05 PROCEDURE — 99207 PR NO BILLABLE SERVICE THIS VISIT: CPT | Performed by: OTOLARYNGOLOGY

## 2023-06-05 NOTE — PROGRESS NOTES
6/5/2023     E-Consult has been denied due to: Complexity of question, needs in-person referral.    Interprofessional consultation requested by:  Patricia Long MD      Clinical Question/Purpose: MY CLINICAL QUESTION IS: Treatment options for ongoing dysfunction of right eustachian tube and vertigo.    Patient assessment and information reviewed: chart notes    Recommendations: Patient needs urgent referral for possible sudden sensorineural hearing loss. Please re-enter as urgent referral.  Needs audiogram      The recommendations provided in this E-Consult are based on a review of clinical data pertinent to the clinical question presented, without a review of the patient's complete medical record or, the benefit of a comprehensive in-person or virtual patient evaluation. This consultation should not replace the clinical judgement and evaluation of the provider ordering this E-Consult. Any new clinical issues, or changes in patient status since the filing of this E-Consult will need to be taken into account when assessing these recommendations. Please contact me if you have further questions.    My total time spent reviewing clinical information and formulating assessment was 10 minutes.    Melodie Marina MD

## 2023-06-05 NOTE — TELEPHONE ENCOUNTER
DENNY for pt to check her mychart for these 2 urgent referrals.  Sent referrals on mychart.  ESTRELLA Denise

## 2023-06-05 NOTE — ADDENDUM NOTE
Addended by: NAVEED CARDONA Y on: 6/5/2023 03:03 PM     Modules accepted: Orders, Level of Service

## 2023-06-05 NOTE — TELEPHONE ENCOUNTER
This encounter is being sent to inform the clinic that this patient has a referral from Patricia Long for the diagnoses of hearing loss and has requested that this patient be seen within 3-5 days and/or with unknown.  Based on the availability of our provider(s), we are unable to accommodate this request.      Were all sites offered this patient?  Yes      Does scheduling algorithm request to schedule next available?  Patient has been scheduled for the first available opening with Dr Jiménez on 9/7/23.  We have informed the patient that the clinic will review their referral and reach out if a sooner appointment is medically necessary.

## 2023-06-05 NOTE — TELEPHONE ENCOUNTER
Recommendations from e-consult were urgent ENT and audiology referral. Both referrals placed.   DM

## 2023-06-06 NOTE — TELEPHONE ENCOUNTER
This patient needs to be scheduled for a ENT Audio ASAP that works for the pt. Once Dr. Jiménez receives the results she will reach out to the patient with next steps

## 2023-06-22 ENCOUNTER — OFFICE VISIT (OUTPATIENT)
Dept: AUDIOLOGY | Facility: CLINIC | Age: 48
End: 2023-06-22
Attending: FAMILY MEDICINE
Payer: COMMERCIAL

## 2023-06-22 DIAGNOSIS — H90.5 SENSORINEURAL HEARING LOSS (SNHL) OF RIGHT EAR, UNSPECIFIED HEARING STATUS ON CONTRALATERAL SIDE: ICD-10-CM

## 2023-06-22 DIAGNOSIS — H90.41 SENSORINEURAL HEARING LOSS (SNHL) OF RIGHT EAR WITH UNRESTRICTED HEARING OF LEFT EAR: Primary | ICD-10-CM

## 2023-06-22 DIAGNOSIS — H91.20 SUDDEN HEARING LOSS: Primary | ICD-10-CM

## 2023-06-22 PROCEDURE — 92565 STENGER TEST PURE TONE: CPT | Performed by: AUDIOLOGIST

## 2023-06-22 PROCEDURE — 92550 TYMPANOMETRY & REFLEX THRESH: CPT | Performed by: AUDIOLOGIST

## 2023-06-22 PROCEDURE — 92557 COMPREHENSIVE HEARING TEST: CPT | Performed by: AUDIOLOGIST

## 2023-06-22 NOTE — TELEPHONE ENCOUNTER
FUTURE VISIT INFORMATION:      FUTURE VISIT INFORMATION:    Date: 9/7/23    Time: 8:30 AM    Location: [facility]  REFERRAL INFORMATION:    Referring provider: Patricia Long MD    Referring providers clinic: [facility]    Reason for visit/diagnosis:  H90.5 (ICD-10-CM) - Sensorineural hearing loss (SNHL) of right ear, unspecified hearing status on contralateral side, Referral from Patricia Long, Referral notes in [facility],  Pt to be seen at [facility] location Priority referral 3-5 days.    RECORDS REQUESTED FROM:       Clinic name Comments Records Status Imaging Status   [facility] - [facility] 6/22/23 audiogram [facility] 5/24/23 OV with Patricia Long MD [facility] [place] 5/18/23 OV note Lucille Bennett PA  Epic

## 2023-06-22 NOTE — PROGRESS NOTES
AUDIOLOGY REPORT     SUMMARY: Audiology visit completed. See audiogram for results.       RECOMMENDATIONS: Follow-up with ENT.     Cintia Rendon CCC-A  Licensed Audiologist   [place] #[id number]

## 2023-06-23 ENCOUNTER — TELEPHONE (OUTPATIENT)
Dept: OTOLARYNGOLOGY | Facility: CLINIC | Age: 48
End: 2023-06-23
Payer: COMMERCIAL

## 2023-07-09 ENCOUNTER — ANCILLARY PROCEDURE (OUTPATIENT)
Dept: MRI IMAGING | Facility: CLINIC | Age: 48
End: 2023-07-09
Attending: OTOLARYNGOLOGY
Payer: COMMERCIAL

## 2023-07-09 DIAGNOSIS — H91.20 SUDDEN HEARING LOSS: ICD-10-CM

## 2023-07-09 PROCEDURE — 70553 MRI BRAIN STEM W/O & W/DYE: CPT | Mod: GC | Performed by: RADIOLOGY

## 2023-07-09 PROCEDURE — A9585 GADOBUTROL INJECTION: HCPCS | Mod: JZ | Performed by: RADIOLOGY

## 2023-07-09 RX ORDER — GADOBUTROL 604.72 MG/ML
7.5 INJECTION INTRAVENOUS ONCE
Status: COMPLETED | OUTPATIENT
Start: 2023-07-09 | End: 2023-07-09

## 2023-07-09 RX ADMIN — GADOBUTROL 6 ML: 604.72 INJECTION INTRAVENOUS at 07:35

## 2023-07-09 NOTE — DISCHARGE INSTRUCTIONS
MRI Contrast Discharge Instructions    The IV contrast you received today will pass out of your body in your  urine. This will happen in the next 24 hours. You will not feel this process.  Your urine will not change color.    Drink at least 4 extra glasses of water or juice today (unless your doctor  has restricted your fluids). This reduces the stress on your kidneys.  You may take your regular medicines.    If you are on dialysis: It is best to have dialysis today.    If you have a reaction: Most reactions happen right away. If you have  any new symptoms after leaving the hospital (such as hives or swelling),  call your hospital at the correct number below. Or call your family doctor.  If you have breathing distress or wheezing, call 911.    Special instructions: ***    I have read and understand the above information.    Signature:______________________________________ Date:___________    Staff:__________________________________________ Date:___________     Time:__________    [facility] Departments:    ___[place]: [phone]  ___[facility]: [phone]  ___[place]: [phone] ___[facility]: [phone]  ___[facility]: [phone]  ___[facility]: [phone]  ___[phone]  ___[facility]: [phone]  ___[phone]

## 2023-07-10 ENCOUNTER — PATIENT OUTREACH (OUTPATIENT)
Dept: CARE COORDINATION | Facility: CLINIC | Age: 48
End: 2023-07-10
Payer: COMMERCIAL

## 2023-07-16 ENCOUNTER — OFFICE VISIT (OUTPATIENT)
Dept: URGENT CARE | Facility: URGENT CARE | Age: 48
End: 2023-07-16
Payer: COMMERCIAL

## 2023-07-16 VITALS
WEIGHT: 135 LBS | HEART RATE: 67 BPM | TEMPERATURE: 97.2 F | HEIGHT: 64 IN | DIASTOLIC BLOOD PRESSURE: 88 MMHG | BODY MASS INDEX: 23.05 KG/M2 | OXYGEN SATURATION: 99 % | SYSTOLIC BLOOD PRESSURE: 152 MMHG

## 2023-07-16 DIAGNOSIS — R35.0 URINARY FREQUENCY: ICD-10-CM

## 2023-07-16 DIAGNOSIS — R30.0 DYSURIA: Primary | ICD-10-CM

## 2023-07-16 LAB
ALBUMIN UR-MCNC: 100 MG/DL
APPEARANCE UR: ABNORMAL
BACTERIA #/AREA URNS HPF: ABNORMAL /HPF
BILIRUB UR QL STRIP: NEGATIVE
CLUE CELLS: NORMAL
COLOR UR AUTO: ABNORMAL
GLUCOSE UR STRIP-MCNC: NEGATIVE MG/DL
HGB UR QL STRIP: ABNORMAL
KETONES UR STRIP-MCNC: NEGATIVE MG/DL
LEUKOCYTE ESTERASE UR QL STRIP: ABNORMAL
NITRATE UR QL: NEGATIVE
PH UR STRIP: 6 [PH] (ref 5–7)
RBC #/AREA URNS AUTO: >100 /HPF
SP GR UR STRIP: 1.01 (ref 1–1.03)
SQUAMOUS #/AREA URNS AUTO: ABNORMAL /LPF
TRICHOMONAS, WET PREP: NORMAL
UROBILINOGEN UR STRIP-ACNC: 0.2 E.U./DL
WBC #/AREA URNS AUTO: ABNORMAL /HPF
WBC'S/HIGH POWER FIELD, WET PREP: NORMAL
YEAST, WET PREP: NORMAL

## 2023-07-16 PROCEDURE — 81001 URINALYSIS AUTO W/SCOPE: CPT | Performed by: PHYSICIAN ASSISTANT

## 2023-07-16 PROCEDURE — 87210 SMEAR WET MOUNT SALINE/INK: CPT

## 2023-07-16 PROCEDURE — 99213 OFFICE O/P EST LOW 20 MIN: CPT | Performed by: PHYSICIAN ASSISTANT

## 2023-07-16 RX ORDER — DIPHENHYDRAMINE HCL 25 MG
25 TABLET ORAL EVERY 6 HOURS PRN
COMMUNITY

## 2023-07-16 RX ORDER — CEFDINIR 300 MG/1
300 CAPSULE ORAL 2 TIMES DAILY
Qty: 14 CAPSULE | Refills: 0 | Status: SHIPPED | OUTPATIENT
Start: 2023-07-16 | End: 2023-07-23

## 2023-07-16 RX ORDER — IBUPROFEN 200 MG
200 TABLET ORAL EVERY 4 HOURS PRN
COMMUNITY

## 2023-07-16 NOTE — PROGRESS NOTES
Urinary frequency  - UA Macroscopic with reflex to Microscopic and Culture - Clinic Collect  - Wet prep - Clinic Collect  - UA Microscopic with Reflex to Culture  - cefdinir (OMNICEF) 300 MG capsule; Take 1 capsule (300 mg) by mouth 2 times daily for 7 days    Dysuria  - cefdinir (OMNICEF) 300 MG capsule; Take 1 capsule (300 mg) by mouth 2 times daily for 7 days    Age 12 months or more  Okay to use Zarbee's   Okay to use Rx Children Tylenol if prescribed (Dose based on weight)    Age 2-12:   Okay to use Children Motrin or Tylenol over the counter.    Adults:  Okay to take acetaminophen 500 mg- 2 tabs (Total of 1000 mg) every 8 hrs   Okay to take ibuprofen 200 mg- 3 tabs (Total of 600 mg) every 6 hours        Okay to use Neti pot for sinus lavage up to three times daily for congestion and sinus pressure if present. Daily hot shower can be beneficial for congestion and body aches. Okay to use bedroom vaporizer or humidifier if symptoms are worse at night. Nightly Vicks Vapor rub and 5-10 mg of Melatonin okay to use for sleep.     Over the counter cough medication and decongestants okay if not prescribed by me during this visit. For homeopathic alternatives to cough syrup and decongestant, feel free to try Elderberry extract.    Okay to use salt water gargles, warm tea (or warm water with lemon and honey), and lozenges for any throat discomfort. Chloraseptic spray is also highly encourages for throat pain/irritation.     Patient will need to get plenty of rest and drink at least 1.5-2 liters of fluids daily for adults and 1-1.5 liters for children. If vomiting and not tolerating liquids for more than 24 hrs, please go to your nearest emergency department for IV fluids and further treatment.     Patient is not contagious after 1 week from start of symptoms. If possible, wear mask for first 7 days. Wash hands regularly and vigorously for 30 seconds often.       Luis Rosario PA-C  [facility] URGENT  CARE    Subjective   48 year old who presents to clinic today for the following health issues:    Urgent Care and Frequency       HPI     Genitourinary - Female  Onset/Duration: Yesterday   Description:   Painful urination (Dysuria): YES           Frequency: YES  Blood in urine (Hematuria): YES  Delay in urine (Hesitency): YES  Intensity: moderate  Progression of Symptoms:  worsening  Accompanying Signs & Symptoms:  Fever/chills: No  Flank pain: No  Nausea and vomiting: No  Vaginal symptoms: none  Abdominal/Pelvic Pain: No  History:   History of frequent UTI s: No  History of kidney stones: No  Precipitating or alleviating factors: None  Therapies tried and outcome:  None      Review of Systems   Review of Systems   See HPI     Objective    Temp: 97.2  F (36.2  C) Temp src: Temporal BP: (!) 152/88 Pulse: 67     SpO2: 99 %       Physical Exam   Physical Exam  Constitutional:       General: She is not in acute distress.     Appearance: Normal appearance. She is normal weight. She is not ill-appearing, toxic-appearing or diaphoretic.   HENT:      Head: Normocephalic and atraumatic.   Cardiovascular:      Rate and Rhythm: Normal rate.      Pulses: Normal pulses.   Pulmonary:      Effort: Pulmonary effort is normal. No respiratory distress.   Abdominal:      Tenderness: There is no right CVA tenderness or left CVA tenderness.   Neurological:      General: No focal deficit present.      Mental Status: She is alert and oriented to person, place, and time. Mental status is at baseline.      Gait: Gait normal.   Psychiatric:         Mood and Affect: Mood normal.         Behavior: Behavior normal.         Thought Content: Thought content normal.         Judgment: Judgment normal.          Results for orders placed or performed in visit on 07/16/23 (from the past 24 hour(s))   UA Macroscopic with reflex to Microscopic and Culture - Clinic Collect    Specimen: Urine, Clean Catch   Result Value Ref Range    Color Urine Marsha (A)  Colorless, Straw, Light Yellow, Yellow    Appearance Urine Cloudy (A) Clear    Glucose Urine Negative Negative mg/dL    Bilirubin Urine Negative Negative    Ketones Urine Negative Negative mg/dL    Specific Gravity Urine 1.015 1.003 - 1.035    Blood Urine Large (A) Negative    pH Urine 6.0 5.0 - 7.0    Protein Albumin Urine 100 (A) Negative mg/dL    Urobilinogen Urine 0.2 0.2, 1.0 E.U./dL    Nitrite Urine Negative Negative    Leukocyte Esterase Urine Trace (A) Negative   Wet prep - Clinic Collect    Specimen: Vagina; Swab   Result Value Ref Range    Trichomonas Absent Absent    Yeast Absent Absent    Clue Cells Absent Absent    WBCs/high power field None None   UA Microscopic with Reflex to Culture   Result Value Ref Range    Bacteria Urine Few (A) None Seen /HPF    RBC Urine >100 (A) 0-2 /HPF /HPF    WBC Urine 0-5 0-5 /HPF /HPF    Squamous Epithelials Urine Few (A) None Seen /LPF    Narrative    Urine Culture not indicated

## 2023-07-24 ENCOUNTER — PATIENT OUTREACH (OUTPATIENT)
Dept: CARE COORDINATION | Facility: CLINIC | Age: 48
End: 2023-07-24
Payer: COMMERCIAL

## 2023-08-03 ENCOUNTER — TELEPHONE (OUTPATIENT)
Dept: OTOLARYNGOLOGY | Facility: CLINIC | Age: 48
End: 2023-08-03
Payer: COMMERCIAL

## 2023-08-07 NOTE — TELEPHONE ENCOUNTER
Patient stated reason is not clear to her as to why it was cancelled please heike out to help patient understand

## 2023-08-08 ENCOUNTER — TELEPHONE (OUTPATIENT)
Dept: OTOLARYNGOLOGY | Facility: CLINIC | Age: 48
End: 2023-08-08
Payer: COMMERCIAL

## 2023-09-07 ENCOUNTER — PRE VISIT (OUTPATIENT)
Dept: OTOLARYNGOLOGY | Facility: CLINIC | Age: 48
End: 2023-09-07

## 2023-09-07 ASSESSMENT — ENCOUNTER SYMPTOMS
COUGH: 0
BREAST MASS: 0
FREQUENCY: 0
HEADACHES: 0
DIARRHEA: 0
NAUSEA: 0
HEARTBURN: 0
HEMATOCHEZIA: 0
PALPITATIONS: 0
ABDOMINAL PAIN: 0
CHILLS: 0
PARESTHESIAS: 0
SORE THROAT: 0
DIZZINESS: 0
SHORTNESS OF BREATH: 0
MYALGIAS: 0
EYE PAIN: 0
CONSTIPATION: 0
WEAKNESS: 0
FEVER: 0
HEMATURIA: 0
ARTHRALGIAS: 0
NERVOUS/ANXIOUS: 0
JOINT SWELLING: 0
DYSURIA: 0

## 2023-09-11 ENCOUNTER — OFFICE VISIT (OUTPATIENT)
Dept: FAMILY MEDICINE | Facility: CLINIC | Age: 48
End: 2023-09-11
Payer: COMMERCIAL

## 2023-09-11 VITALS
HEART RATE: 66 BPM | HEIGHT: 65 IN | OXYGEN SATURATION: 97 % | BODY MASS INDEX: 22.99 KG/M2 | WEIGHT: 138 LBS | TEMPERATURE: 97.8 F | SYSTOLIC BLOOD PRESSURE: 139 MMHG | DIASTOLIC BLOOD PRESSURE: 88 MMHG | RESPIRATION RATE: 16 BRPM

## 2023-09-11 DIAGNOSIS — Z30.41 ENCOUNTER FOR SURVEILLANCE OF CONTRACEPTIVE PILLS: ICD-10-CM

## 2023-09-11 DIAGNOSIS — Z12.4 CERVICAL CANCER SCREENING: ICD-10-CM

## 2023-09-11 DIAGNOSIS — Z12.31 ENCOUNTER FOR SCREENING MAMMOGRAM FOR BREAST CANCER: ICD-10-CM

## 2023-09-11 DIAGNOSIS — Z00.00 ROUTINE GENERAL MEDICAL EXAMINATION AT A HEALTH CARE FACILITY: ICD-10-CM

## 2023-09-11 DIAGNOSIS — F33.42 RECURRENT MAJOR DEPRESSIVE DISORDER, IN FULL REMISSION (H): ICD-10-CM

## 2023-09-11 DIAGNOSIS — Z13.1 SCREENING FOR DIABETES MELLITUS: ICD-10-CM

## 2023-09-11 DIAGNOSIS — Z13.220 LIPID SCREENING: ICD-10-CM

## 2023-09-11 LAB
CHOLEST SERPL-MCNC: 259 MG/DL
ERYTHROCYTE [DISTWIDTH] IN BLOOD BY AUTOMATED COUNT: 12.1 % (ref 10–15)
HBA1C MFR BLD: 4.9 % (ref 0–5.6)
HCT VFR BLD AUTO: 37.6 % (ref 35–47)
HDLC SERPL-MCNC: 65 MG/DL
HGB BLD-MCNC: 12.3 G/DL (ref 11.7–15.7)
LDLC SERPL CALC-MCNC: 155 MG/DL
MCH RBC QN AUTO: 30.9 PG (ref 26.5–33)
MCHC RBC AUTO-ENTMCNC: 32.7 G/DL (ref 31.5–36.5)
MCV RBC AUTO: 95 FL (ref 78–100)
NONHDLC SERPL-MCNC: 194 MG/DL
PLATELET # BLD AUTO: 355 10E3/UL (ref 150–450)
RBC # BLD AUTO: 3.98 10E6/UL (ref 3.8–5.2)
TRIGL SERPL-MCNC: 193 MG/DL
WBC # BLD AUTO: 8.3 10E3/UL (ref 4–11)

## 2023-09-11 PROCEDURE — 36415 COLL VENOUS BLD VENIPUNCTURE: CPT | Performed by: FAMILY MEDICINE

## 2023-09-11 PROCEDURE — 90746 HEPB VACCINE 3 DOSE ADULT IM: CPT | Performed by: FAMILY MEDICINE

## 2023-09-11 PROCEDURE — 99213 OFFICE O/P EST LOW 20 MIN: CPT | Mod: 25 | Performed by: FAMILY MEDICINE

## 2023-09-11 PROCEDURE — 87624 HPV HI-RISK TYP POOLED RSLT: CPT | Performed by: FAMILY MEDICINE

## 2023-09-11 PROCEDURE — 80061 LIPID PANEL: CPT | Performed by: FAMILY MEDICINE

## 2023-09-11 PROCEDURE — 83036 HEMOGLOBIN GLYCOSYLATED A1C: CPT | Performed by: FAMILY MEDICINE

## 2023-09-11 PROCEDURE — 99396 PREV VISIT EST AGE 40-64: CPT | Mod: 25 | Performed by: FAMILY MEDICINE

## 2023-09-11 PROCEDURE — 90686 IIV4 VACC NO PRSV 0.5 ML IM: CPT | Performed by: FAMILY MEDICINE

## 2023-09-11 PROCEDURE — G0145 SCR C/V CYTO,THINLAYER,RESCR: HCPCS | Performed by: FAMILY MEDICINE

## 2023-09-11 PROCEDURE — 90715 TDAP VACCINE 7 YRS/> IM: CPT | Performed by: FAMILY MEDICINE

## 2023-09-11 PROCEDURE — 85027 COMPLETE CBC AUTOMATED: CPT | Performed by: FAMILY MEDICINE

## 2023-09-11 PROCEDURE — 90472 IMMUNIZATION ADMIN EACH ADD: CPT | Performed by: FAMILY MEDICINE

## 2023-09-11 PROCEDURE — 90471 IMMUNIZATION ADMIN: CPT | Performed by: FAMILY MEDICINE

## 2023-09-11 RX ORDER — SERTRALINE HYDROCHLORIDE 100 MG/1
100 TABLET, FILM COATED ORAL DAILY
Qty: 90 TABLET | Refills: 3 | Status: SHIPPED | OUTPATIENT
Start: 2023-09-11 | End: 2023-09-11

## 2023-09-11 RX ORDER — NORGESTIMATE AND ETHINYL ESTRADIOL 0.25-0.035
1 KIT ORAL DAILY
Qty: 84 TABLET | Refills: 3 | Status: SHIPPED | OUTPATIENT
Start: 2023-09-11 | End: 2023-09-11

## 2023-09-11 ASSESSMENT — ENCOUNTER SYMPTOMS
PALPITATIONS: 0
FREQUENCY: 0
EYE PAIN: 0
JOINT SWELLING: 0
DYSURIA: 0
ABDOMINAL PAIN: 0
HEARTBURN: 0
HEADACHES: 0
DIZZINESS: 0
HEMATOCHEZIA: 0
DIARRHEA: 0
SORE THROAT: 0
WEAKNESS: 0
MYALGIAS: 0
SHORTNESS OF BREATH: 0
NERVOUS/ANXIOUS: 0
HEMATURIA: 0
FEVER: 0
PARESTHESIAS: 0
CHILLS: 0
NAUSEA: 0
CONSTIPATION: 0
COUGH: 0
ARTHRALGIAS: 0
BREAST MASS: 0

## 2023-09-11 NOTE — PROGRESS NOTES
SUBJECTIVE:   CC: Maria G is an 48 year old who presents for preventive health visit.       9/11/2023    10:44 AM   Additional Questions   Roomed by Marcelina   Accompanied by Self       Healthy Habits:     Getting at least 3 servings of Calcium per day:  NO    Bi-annual eye exam:  Yes    Dental care twice a year:  NO    Sleep apnea or symptoms of sleep apnea:  None    Diet:  Regular (no restrictions)    Frequency of exercise:  4-5 days/week    Duration of exercise:  45-60 minutes    Taking medications regularly:  Yes    Medication side effects:  Not applicable    Additional concerns today:  Yes      Social History     Tobacco Use    Smoking status: Never    Smokeless tobacco: Never   Substance Use Topics    Alcohol use: Yes             9/7/2023     5:20 PM   Alcohol Use   Prescreen: >3 drinks/day or >7 drinks/week? No     Reviewed orders with patient.  Reviewed health maintenance and updated orders accordingly - Yes      Breast Cancer Screening:    FHS-7:       7/29/2021     3:22 PM 10/14/2022    10:52 AM 9/7/2023     5:21 PM   Breast CA Risk Assessment (FHS-7)   Did any of your first-degree relatives have breast or ovarian cancer? Yes Yes Yes   Did any of your relatives have bilateral breast cancer? No No No   Did any man in your family have breast cancer? No No No   Did any woman in your family have breast and ovarian cancer? No No No   Did any woman in your family have breast cancer before age 50 y? Yes Yes Yes   Do you have 2 or more relatives with breast and/or ovarian cancer? No No No   Do you have 2 or more relatives with breast and/or bowel cancer? No No No       Pertinent mammograms are reviewed under the imaging tab.    History of abnormal Pap smear: NO - age 30-65 PAP every 5 years with negative HPV co-testing recommended      Latest Ref Rng & Units 7/7/2020    12:03 PM 7/7/2020    10:06 AM   PAP / HPV   PAP (Historical)   NIL    HPV 16 DNA NEG^Negative Negative     HPV 18 DNA NEG^Negative Negative    "  Other HR HPV NEG^Negative Negative       Reviewed and updated as needed this visit by clinical staff   Tobacco  Allergies  Meds              Reviewed and updated as needed this visit by Provider                     Review of Systems   Constitutional:  Negative for chills and fever.   HENT:  Positive for hearing loss. Negative for congestion, ear pain and sore throat.    Eyes:  Negative for pain and visual disturbance.   Respiratory:  Negative for cough and shortness of breath.    Cardiovascular:  Negative for chest pain, palpitations and peripheral edema.   Gastrointestinal:  Negative for abdominal pain, constipation, diarrhea, heartburn, hematochezia and nausea.   Breasts:  Negative for tenderness, breast mass and discharge.   Genitourinary:  Negative for dysuria, frequency, genital sores, hematuria, pelvic pain, urgency, vaginal bleeding and vaginal discharge.   Musculoskeletal:  Negative for arthralgias, joint swelling and myalgias.   Skin:  Negative for rash.   Neurological:  Negative for dizziness, weakness, headaches and paresthesias.   Psychiatric/Behavioral:  Negative for mood changes. The patient is not nervous/anxious.           OBJECTIVE:   /88 (BP Location: Right arm, Patient Position: Sitting, Cuff Size: Adult Regular)   Pulse 66   Temp 97.8  F (36.6  C) (Temporal)   Resp 16   Ht 1.64 m (5' 4.57\")   Wt 62.6 kg (138 lb)   LMP 09/03/2023   SpO2 97%   BMI 23.27 kg/m    Physical Exam  GENERAL: healthy, alert and no distress  EYES: Eyes grossly normal to inspection  HENT: ear canals and TM's normal, nose and mouth without ulcers or lesions  NECK: no adenopathy, no asymmetry, masses, or scars and thyroid normal to palpation  RESP: lungs clear to auscultation - no rales, rhonchi or wheezes  BREAST: normal without masses, tenderness or nipple discharge and no palpable axillary masses or adenopathy  CV: regular rate and rhythm, normal S1 S2  ABDOMEN: soft, nontender, no hepatosplenomegaly, no " masses and bowel sounds normal   (female): normal female external genitalia, normal urethral meatus, vaginal mucosa pink, moist, well rugated, and normal cervix  MS: no gross musculoskeletal defects noted, no edema  SKIN: no suspicious lesions or rashes  NEURO: Normal strength and tone, mentation intact and speech normal  PSYCH: mentation appears normal, affect normal/bright    Diagnostic Test Results:  Labs reviewed in Epic    ASSESSMENT/PLAN:     1. Routine general medical examination at a health care facility  - HEPATITIS B VACCINE ADULT 3 DOSE IM (ENGERIX-B/RECOMBIVAX HB)  - TDAP 10-64Y (ADACEL,BOOSTRIX)  - INFLUENZA VACCINE IM > 6 MONTHS VALENT IIV4 (AFLURIA/FLUZONE)  - Followed by dermatology  - up to date with cologard   - lipid, CBC and hgba1c    2. Recurrent major depressive disorder, in full remission (H)      6/24/2021     9:00 AM 8/4/2022     9:32 PM 5/23/2023    11:05 PM   PHQ   PHQ-9 Total Score 0 2 2   Q9: Thoughts of better off dead/self-harm past 2 weeks Not at all Not at all Not at all      - sertraline (ZOLOFT) 100 MG tablet; Take 1 tablet (100 mg) by mouth daily  Dispense: 90 tablet; Refill: 3    3. Cervical cancer screening  - Pap Screen with HPV - recommended age 30 - 65 years    4. Encounter for surveillance of contraceptive pills  - norgestimate-ethinyl estradiol (ORTHO-CYCLEN) 0.25-35 MG-MCG tablet; Take 1 tablet by mouth daily  Dispense: 84 tablet; Refill: 3    5. Encounter for screening mammogram for breast cancer  - MA Screen Bilateral w/Rudy; Future         Patient has been advised of split billing requirements and indicates understanding: Yes      COUNSELING:  Reviewed preventive health counseling, as reflected in patient instructions        She reports that she has never smoked. She has never used smokeless tobacco.          Patricia Long MD  [facility]

## 2023-09-11 NOTE — NURSING NOTE
Prior to immunization administration, verified patients identity using patient s name and date of birth. Please see Immunization Activity for additional information.     Screening Questionnaire for Adult Immunization    Are you sick today?   No   Do you have allergies to medications, food, a vaccine component or latex?   No   Have you ever had a serious reaction after receiving a vaccination?   No   Do you have a long-term health problem with heart, lung, kidney, or metabolic disease (e.g., diabetes), asthma, a blood disorder, no spleen, complement component deficiency, a cochlear implant, or a spinal fluid leak?  Are you on long-term aspirin therapy?   No   Do you have cancer, leukemia, HIV/AIDS, or any other immune system problem?   No   Do you have a parent, brother, or sister with an immune system problem?   No   In the past 3 months, have you taken medications that affect  your immune system, such as prednisone, other steroids, or anticancer drugs; drugs for the treatment of rheumatoid arthritis, Crohn s disease, or psoriasis; or have you had radiation treatments?   No   Have you had a seizure, or a brain or other nervous system problem?   No   During the past year, have you received a transfusion of blood or blood    products, or been given immune (gamma) globulin or antiviral drug?   No   For women: Are you pregnant or is there a chance you could become       pregnant during the next month?   No   Have you received any vaccinations in the past 4 weeks?   No     Immunization questionnaire answers were all negative.      Patient instructed to remain in clinic for 15 minutes afterwards, and to report any adverse reactions.     Screening performed by Sheila Oviedo MA on 9/11/2023 at 11:51 AM.

## 2023-09-14 ENCOUNTER — PATIENT OUTREACH (OUTPATIENT)
Dept: CARE COORDINATION | Facility: CLINIC | Age: 48
End: 2023-09-14
Payer: COMMERCIAL

## 2023-09-14 LAB
BKR LAB AP GYN ADEQUACY: NORMAL
BKR LAB AP GYN INTERPRETATION: NORMAL
BKR LAB AP HPV REFLEX: NORMAL
BKR LAB AP PREVIOUS ABNORMAL: NORMAL
PATH REPORT.COMMENTS IMP SPEC: NORMAL
PATH REPORT.COMMENTS IMP SPEC: NORMAL
PATH REPORT.RELEVANT HX SPEC: NORMAL

## 2023-09-15 LAB
HUMAN PAPILLOMA VIRUS 16 DNA: NEGATIVE
HUMAN PAPILLOMA VIRUS 18 DNA: NEGATIVE
HUMAN PAPILLOMA VIRUS FINAL DIAGNOSIS: NORMAL
HUMAN PAPILLOMA VIRUS OTHER HR: NEGATIVE

## 2023-10-12 ENCOUNTER — PATIENT OUTREACH (OUTPATIENT)
Dept: CARE COORDINATION | Facility: CLINIC | Age: 48
End: 2023-10-12
Payer: COMMERCIAL

## 2023-10-17 ENCOUNTER — ANCILLARY PROCEDURE (OUTPATIENT)
Dept: MAMMOGRAPHY | Facility: CLINIC | Age: 48
End: 2023-10-17
Attending: FAMILY MEDICINE
Payer: COMMERCIAL

## 2023-10-17 DIAGNOSIS — Z12.31 ENCOUNTER FOR SCREENING MAMMOGRAM FOR BREAST CANCER: ICD-10-CM

## 2023-10-17 PROCEDURE — 77067 SCR MAMMO BI INCL CAD: CPT | Mod: GC | Performed by: RADIOLOGY

## 2023-10-17 PROCEDURE — 77063 BREAST TOMOSYNTHESIS BI: CPT | Mod: GC | Performed by: RADIOLOGY

## 2023-10-20 ENCOUNTER — OFFICE VISIT (OUTPATIENT)
Dept: URGENT CARE | Facility: URGENT CARE | Age: 48
End: 2023-10-20
Payer: COMMERCIAL

## 2023-10-20 VITALS
WEIGHT: 135 LBS | HEART RATE: 54 BPM | DIASTOLIC BLOOD PRESSURE: 80 MMHG | OXYGEN SATURATION: 100 % | RESPIRATION RATE: 17 BRPM | HEIGHT: 64 IN | BODY MASS INDEX: 23.05 KG/M2 | TEMPERATURE: 97.2 F | SYSTOLIC BLOOD PRESSURE: 122 MMHG

## 2023-10-20 DIAGNOSIS — M77.52 LEFT ANKLE TENDONITIS: Primary | ICD-10-CM

## 2023-10-20 LAB
FLUAV AG SPEC QL IA: NEGATIVE
FLUBV AG SPEC QL IA: NEGATIVE

## 2023-10-20 PROCEDURE — 99213 OFFICE O/P EST LOW 20 MIN: CPT | Performed by: NURSE PRACTITIONER

## 2023-10-20 NOTE — NURSING NOTE
Flu swab accidentally ordered on this patient, billing has been canceled and left message for pt. Leidy Sultana, CMA

## 2023-10-20 NOTE — PROGRESS NOTES
Chief Complaint   Patient presents with    Urgent Care    Musculoskeletal Problem     Left ankle pain since last Saturday.      SUBJECTIVE:  Maria G Khan is a 48 year old female who presents to the clinic today with left ankle pain over the last week.  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  It feels moderately sore.  No redness warmth swelling bruising pitting edema bulging varicose vein fevers last range of motion.  She can walk on it just fine.  Has tried compression wrap and that felt like it made it worse.  She has Voltaren gel at home.  Had foot surgery previously, but this feels to be unrelated.    Past Medical History:   Diagnosis Date    MDD (major depressive disorder)     Seasonal allergies      cyclobenzaprine (FLEXERIL) 5 MG tablet, Take 1-2 tablets (5-10 mg) by mouth 3 times daily as needed for muscle spasms  Multiple Vitamins-Minerals (MULTIVITAMIN ADULT PO),   norgestimate-ethinyl estradiol (ORTHO-CYCLEN) 0.25-35 MG-MCG tablet, Take 1 tablet by mouth daily  sertraline (ZOLOFT) 100 MG tablet, Take 1 tablet (100 mg) by mouth daily  aspirin (ASA) 81 MG EC tablet, Take 1 tablet (81 mg) by mouth daily  diphenhydrAMINE (BENADRYL) 25 MG tablet, Take 25 mg by mouth every 6 hours as needed for itching or allergies  fexofenadine (ALLEGRA) 180 MG tablet, Take 180 mg by mouth daily  ibuprofen (ADVIL/MOTRIN) 200 MG tablet, Take 200 mg by mouth every 4 hours as needed for pain  meclizine (ANTIVERT) 25 MG tablet, Take 1 tablet (25 mg) by mouth 3 times daily as needed for dizziness  ondansetron (ZOFRAN ODT) 4 MG ODT tab, Take 1 tablet (4 mg) by mouth every 8 hours as needed for nausea  triamcinolone (KENALOG) 0.1 % external cream, Apply topically 2 times daily To itchy area on R arm    COVID-19 mRNA BIVALENT vaccine (PFIZER BOOSTER) 30 MCG/0.3ML injection 30 mcg      Social History     Tobacco Use    Smoking status: Never    Smokeless  "tobacco: Never   Substance Use Topics    Alcohol use: Yes     Allergies   Allergen Reactions    Erythromycin Nausea       Review of Systems  All systems negative except for those listed above in HPI.    EXAM:   /80   Pulse 54   Temp 97.2  F (36.2  C) (Temporal)   Resp 17   Ht 1.626 m (5' 4\")   Wt 61.2 kg (135 lb)   LMP 09/03/2023   SpO2 100%   BMI 23.17 kg/m      Physical Exam  Vitals reviewed.   Constitutional:       Appearance: Normal appearance.   HENT:      Head: Normocephalic and atraumatic.   Cardiovascular:      Rate and Rhythm: Normal rate.      Pulses: Normal pulses.   Pulmonary:      Effort: Pulmonary effort is normal.   Musculoskeletal:         General: Tenderness and signs of injury present. No swelling or deformity. Normal range of motion.      Right lower leg: No edema.      Left lower leg: No edema.      Comments: Negative Knox squeeze test for Achilles tendon rupture.   Skin:     General: Skin is warm and dry.      Findings: No bruising, erythema or rash.   Neurological:      General: No focal deficit present.      Mental Status: She is alert and oriented to person, place, and time.      Sensory: No sensory deficit.      Gait: Gait normal.   Psychiatric:         Mood and Affect: Mood normal.         Behavior: Behavior normal.       *influenza ordered in error by support staff    ASSESSMENT:    ICD-10-CM    1. Left ankle tendonitis  M77.52         PLAN:    Left ankle tendinitis  Rest ice compression elevate  Rotate Tylenol ibuprofen every 4-6 hours and start topical Voltaren  Avoid activities that elicit severe pain  Patient declines x-ray here today, discussed possibility of plantar fasciitis involvement  Low Wells her score for DVT, no signs of cellulitis gout or Achilles tendon rupture  Reevaluation if lingering or worsening    Follow up with primary care provider with any problems, questions or concerns or if symptoms worsen or fail to improve. Patient agreed to plan and " verbalized understanding.    Carey Saleh, FNP-[facility]

## 2023-10-20 NOTE — PATIENT INSTRUCTIONS
Left ankle sprain tendinitis  Rest ice compression elevate  Rotate Tylenol ibuprofen every 4-6 hours and start topical Voltaren  Avoid activities that elicit severe pain  Patient declines x-ray here today, discussed possibility of plantar fasciitis involvement  Low Wells her score for DVT, no signs of cellulitis gout or Achilles tendon rupture  Reevaluation if lingering or worsening

## 2023-10-24 ENCOUNTER — OFFICE VISIT (OUTPATIENT)
Dept: DERMATOLOGY | Facility: CLINIC | Age: 48
End: 2023-10-24
Payer: COMMERCIAL

## 2023-10-24 DIAGNOSIS — F45.8 NEUROGENIC PRURITUS: ICD-10-CM

## 2023-10-24 DIAGNOSIS — D23.9 DERMATOFIBROMA: ICD-10-CM

## 2023-10-24 DIAGNOSIS — L29.9 BRACHIORADIAL PRURITUS: Primary | ICD-10-CM

## 2023-10-24 DIAGNOSIS — D22.9 MULTIPLE BENIGN MELANOCYTIC NEVI: ICD-10-CM

## 2023-10-24 DIAGNOSIS — D18.01 CHERRY ANGIOMA: ICD-10-CM

## 2023-10-24 DIAGNOSIS — L82.1 SEBORRHEIC KERATOSES: ICD-10-CM

## 2023-10-24 PROCEDURE — 99213 OFFICE O/P EST LOW 20 MIN: CPT | Mod: GC | Performed by: DERMATOLOGY

## 2023-10-24 RX ORDER — CAPSAICIN 0.75 MG/G
CREAM TOPICAL 3 TIMES DAILY
Qty: 57 G | Refills: 11 | Status: SHIPPED | OUTPATIENT
Start: 2023-10-24 | End: 2023-11-02

## 2023-10-24 ASSESSMENT — PAIN SCALES - GENERAL: PAINLEVEL: NO PAIN (0)

## 2023-10-24 NOTE — PROGRESS NOTES
[facility] Dermatology Note  Encounter Date: Oct 24, 2023  Office Visit     Dermatology Problem List:  #. Brachioradial pruritus, R forearm   - current tx: capsaicin 0.075% cream  Previous tx: TAC 0.1% cream (did not improve)  #. Multiple benign melanocytic nevi  # DF, LLE  ____________________________________________    Assessment & Plan:     # Multiple benign melanocytic nevi  - ABCDEs: Counseled ABCDEs of melanoma: Asymmetry, Border (irregularity), Color (not uniform, changes in color), Diameter (greater than 6 mm which is about the size of a pencil eraser), and Evolving (any changes in preexisting moles).  - Sun protection: Counseled SPF30+ sunscreen, UPF clothing, sun avoidance, tanning bed avoidance.      # Cherry angiomas   - Benign etiology discussed with pt, no further treatment necessary     # Brachioradial pruritus, R forearm.  Active problem.  - Discussed etiology, and recommended starting treatment with Capsaicin 0.075% cream bid; rx provided    # Dermatofibroma, L lower extremity  - benign etiology discussed with pt. No further treatment necessary        Procedures Performed:   None      Follow-up: 1-2 year(s) in-person, or earlier for new or changing lesions    Staff and Resident:       Salma Loredo DO, MS  PGY-2  Dermatology  [facility]  Oct 24, 2023 10:57 AM    I have seen and examined this patient and agree with the assessment and plan as documented in the resident's note.    Meliton Sultana MD  Dermatology Attending    ____________________________________________    CC: Skin Check (Maria G is here today for a skin check)    HPI:  Ms. Maria G Khan is a(n) 48 year old female who presents today as a return patient for FBSE and pruritus of right arm.   Right arm intermittent itching comes and goes, takes fexofenadine and has seen improvement. Did not improve with triamcinolone. No new lesions of concern today. Does not have any children. Has not had a  colonoscopy yet.    Patient is otherwise feeling well, without additional skin concerns.    Labs Reviewed:  N/A    Physical Exam:  Vitals: LMP 09/03/2023   SKIN: Total skin excluding the undergarment areas was performed. The exam included the head/face, neck, both arms, chest, back, abdomen, both legs, digits and/or nails.   - There is a firm tan/flesh colored papule that dimples with lateral pressure on the right thigh and LLE.  Multiple regular brown pigmented macules and papules are identified on the trunk and arms.   There is a waxy stuck on tan to brown papule on the legs.     - No other lesions of concern on areas examined.     Medications:  Current Outpatient Medications   Medication     aspirin (ASA) 81 MG EC tablet     cyclobenzaprine (FLEXERIL) 5 MG tablet     diphenhydrAMINE (BENADRYL) 25 MG tablet     fexofenadine (ALLEGRA) 180 MG tablet     ibuprofen (ADVIL/MOTRIN) 200 MG tablet     meclizine (ANTIVERT) 25 MG tablet     Multiple Vitamins-Minerals (MULTIVITAMIN ADULT PO)     norgestimate-ethinyl estradiol (ORTHO-CYCLEN) 0.25-35 MG-MCG tablet     ondansetron (ZOFRAN ODT) 4 MG ODT tab     sertraline (ZOLOFT) 100 MG tablet     triamcinolone (KENALOG) 0.1 % external cream     Current Facility-Administered Medications   Medication     COVID-19 mRNA BIVALENT vaccine (PFIZER BOOSTER) 30 MCG/0.3ML injection 30 mcg      Past Medical History:   Patient Active Problem List   Diagnosis     Abnormal Pap smear of cervix     MDD (major depressive disorder)     Seasonal allergies     Brachioradial pruritus     Skin cancer screening     Cherry angioma     Dermatofibroma     Past Medical History:   Diagnosis Date     MDD (major depressive disorder)      Seasonal allergies        CC No referring provider defined for this encounter. on close of this encounter.

## 2023-10-24 NOTE — PATIENT INSTRUCTIONS
Continue taking fexofenadine daily  Capsaicin cream apply to the arm 2-3 times daily.    Checking for Skin Cancer  You can find cancer early by checking your skin each month. There are 3 kinds of skin cancer. They are melanoma, basal cell carcinoma, and squamous cell carcinoma. Doing monthly skin checks is the best way to find new marks or skin changes. Follow the instructions below for checking your skin.     The ABCDEs of checking moles for melanoma  Check your moles or growths for signs of melanoma using ABCDE:  Asymmetry: the sides of the mole or growth don t match  Border: the edges are ragged, notched, or blurred  Color: the color within the mole or growth varies  Diameter: the mole or growth is larger than 6 mm (size of a pencil eraser)  Evolving: the size, shape, or color of the mole or growth is changing       Checking for other types of skin cancer  Basal cell carcinoma or squamous cell carcinoma have symptoms such as:  A spot or mole that looks different from all other marks on your skin  Changes in how an area feels, such as itching, tenderness, or pain  Changes in the skin's surface, such as oozing, bleeding, or scaliness  A sore that does not heal  New swelling or redness beyond the border of a mole     Who s at risk?  Anyone can get skin cancer. But you are at greater risk if you have:  Fair skin, light-colored hair, or light-colored eyes  Many moles or abnormal moles on your skin  A history of sunburns from sunlight or tanning beds  A family history of skin cancer  A history of exposure to radiation or chemicals  A weakened immune system  If you have had skin cancer in the past, you are at risk for recurring skin cancer.     How to check your skin  Do your monthly skin checkups in front of a full-length mirror. Check all parts of your body, including your:  Head (ears, face, neck, and scalp)  Torso (front, back, and sides)  Arms (tops, undersides, upper, and lower armpits)  Hands (palms, backs, and  fingers, including under the nails)  Buttocks and genitals  Legs (front, back, and sides)  Feet (tops, soles, toes, including under the nails, and between toes)  If you have a lot of moles, take digital photos of them each month. Make sure to take photos both up close and from a distance. These can help you see if any moles change over time.  Most skin changes are not cancer. But if you see any changes in your skin, call your doctor right away. Only he or she can diagnose a problem. If you have skin cancer, seeing your doctor can be the first step toward getting the treatment that could save your life.     Use sunscreen of SPF 30 or greater. Apply liberally.  Relaxing in the sun may feel good. But it isn t good for your skin. In fact, the sun s harmful rays are the major cause of skin cancer. This is a serious disease that can be life-threatening. People of all ages, races, and backgrounds are at risk.  Skin cancer is the most common cancer in the [place] But in most cases, it can be prevented.     Your role in prevention  You can act today to help prevent skin cancer. Start by avoiding the sun s UV (ultraviolet) rays. And don t use tanning beds or lamps. They are no safer than the sun. Taking these steps can help keep you from getting skin cancer. It can also help prevent wrinkles and other aging effects caused by the sun. Make sure your children also follow these safeguards. Now is the time to start taking steps to prevent skin cancer.     When you are outdoors  Protect your skin when you go out during the day. Take safety steps whenever you go out to eat, run errands by car or on foot, or do any outdoor activity. There isn t just one easy way to protect your skin. It s best to follow all of these steps:  Wear tightly woven clothing that covers your skin. Put on a wide-brimmed hat to protect your face, ears, and scalp.  Watch the clock. Try to stay out of the sun between 10 a.m. and 4 p.m. That's when the sun's rays  "are strongest.  Head for the shade or create your own. Use an umbrella when sitting or strolling.  Know that the sun s rays can reflect off sand, water, and snow. This can harm your skin. Take extra care when you are near reflective surfaces.  Keep in mind that even when the weather is hazy or cloudy, your skin can be exposed to strong UV rays.  Shield your skin with sunscreen. Also use sunscreen on your children s skin. Keep babies younger than 6 months old out of the sun.     Tips for using sunscreen  To help prevent skin cancer, choose the right sunscreen and use it correctly. Try these tips:  Choose a sunscreen that has an SPF (sun protection factor) of at least 30. Also choose a sunscreen labeled \"broad spectrum.  This will protect you from both UVA and UVB (ultraviolet A and B) rays.  If one brand irritates your skin, try another, such as one without fragrance.  Use a water-resistant sunscreen if you swim or sweat.  Use at least 1 ounce of sunscreen to cover exposed areas. This is enough to fill a shot glass. You might need to adjust the amount depending on your body size.  Put the sunscreen on dry skin about 15 minutes before going outdoors. This gives it time to soak in.  Reapply sunscreen every 2 hours. If you re active, do this more often.  Cover any sun-exposed skin, from your face to your feet. Don t forget your scalp, ears, and lips.  Know that while sunscreen helps protect you, it isn t enough. Sunscreens extend the length of time you can be outdoors before your skin starts to get red. But they don't give you total protection. Using sunscreen doesn't mean you can stay out in the sun for an unlimited time. Your skin cells are still being damaged. You should also wear protective clothing. And try to stay out of the sun as much as you can, especially from 10 a.m. to 4 p.m.    Follow-up in 1 year  "

## 2023-10-24 NOTE — LETTER
10/24/2023       RE: Maria G Khan  [address]  [facility] [address]     Dear Colleague,    Thank you for referring your patient, Maria G Khan, to the [facility]ERMATOLOG[facility] [place] [facility]. Please see a copy of my visit note below.    [facility] Dermatology Note  Encounter Date: Oct 24, 2023  Office Visit     Dermatology Problem List:  #. Brachioradial pruritus, R forearm   - current tx: capsaicin 0.075% cream  Previous tx: TAC 0.1% cream (did not improve)  #. Multiple benign melanocytic nevi  # DF, LLE  ____________________________________________    Assessment & Plan:     # Multiple benign melanocytic nevi  - ABCDEs: Counseled ABCDEs of melanoma: Asymmetry, Border (irregularity), Color (not uniform, changes in color), Diameter (greater than 6 mm which is about the size of a pencil eraser), and Evolving (any changes in preexisting moles).  - Sun protection: Counseled SPF30+ sunscreen, UPF clothing, sun avoidance, tanning bed avoidance.      # Cherry angiomas   - Benign etiology discussed with pt, no further treatment necessary     # Brachioradial pruritus, R forearm.  Active problem.  - Discussed etiology, and recommended starting treatment with Capsaicin 0.075% cream bid; rx provided    # Dermatofibroma, L lower extremity  - benign etiology discussed with pt. No further treatment necessary        Procedures Performed:   None      Follow-up: 1-2 year(s) in-person, or earlier for new or changing lesions    Staff and Resident:       Salma Loredo DO, MS  PGY-2  Dermatology  [facility]  Oct 24, 2023 10:57 AM    I have seen and examined this patient and agree with the assessment and plan as documented in the resident's note.    Meliton Sultnaa MD  Dermatology Attending    ____________________________________________    CC: Skin Check (Maria G is here today for a skin check)    HPI:  Ms. Maria G VALENTE  Erin is a(n) 48 year old female who presents today as a return patient for FBSE and pruritus of right arm.   Right arm intermittent itching comes and goes, takes fexofenadine and has seen improvement. Did not improve with triamcinolone. No new lesions of concern today. Does not have any children. Has not had a colonoscopy yet.    Patient is otherwise feeling well, without additional skin concerns.    Labs Reviewed:  N/A    Physical Exam:  Vitals: LMP 09/03/2023   SKIN: Total skin excluding the undergarment areas was performed. The exam included the head/face, neck, both arms, chest, back, abdomen, both legs, digits and/or nails.   - There is a firm tan/flesh colored papule that dimples with lateral pressure on the right thigh and LLE.  Multiple regular brown pigmented macules and papules are identified on the trunk and arms.   There is a waxy stuck on tan to brown papule on the legs.     - No other lesions of concern on areas examined.     Medications:  Current Outpatient Medications   Medication    aspirin (ASA) 81 MG EC tablet    cyclobenzaprine (FLEXERIL) 5 MG tablet    diphenhydrAMINE (BENADRYL) 25 MG tablet    fexofenadine (ALLEGRA) 180 MG tablet    ibuprofen (ADVIL/MOTRIN) 200 MG tablet    meclizine (ANTIVERT) 25 MG tablet    Multiple Vitamins-Minerals (MULTIVITAMIN ADULT PO)    norgestimate-ethinyl estradiol (ORTHO-CYCLEN) 0.25-35 MG-MCG tablet    ondansetron (ZOFRAN ODT) 4 MG ODT tab    sertraline (ZOLOFT) 100 MG tablet    triamcinolone (KENALOG) 0.1 % external cream     Current Facility-Administered Medications   Medication    COVID-19 mRNA BIVALENT vaccine (PFIZER BOOSTER) 30 MCG/0.3ML injection 30 mcg      Past Medical History:   Patient Active Problem List   Diagnosis    Abnormal Pap smear of cervix    MDD (major depressive disorder)    Seasonal allergies    Brachioradial pruritus    Skin cancer screening    Cherry angioma    Dermatofibroma     Past Medical History:   Diagnosis Date    MDD (major  depressive disorder)     Seasonal allergies        CC No referring provider defined for this encounter. on close of this encounter.

## 2023-10-24 NOTE — NURSING NOTE
Dermatology Rooming Note    Maria G Khan's goals for this visit include:   Chief Complaint   Patient presents with    Skin Check     Maria G is here today for a skin check     JAMES Muniz

## 2023-10-27 ENCOUNTER — MYC MEDICAL ADVICE (OUTPATIENT)
Dept: DERMATOLOGY | Facility: CLINIC | Age: 48
End: 2023-10-27
Payer: COMMERCIAL

## 2023-11-02 RX ORDER — CAPSAICIN 0.75 MG/G
CREAM TOPICAL 3 TIMES DAILY
Qty: 57 G | Refills: 11 | Status: SHIPPED | OUTPATIENT
Start: 2023-11-02 | End: 2023-12-03

## 2023-11-02 NOTE — TELEPHONE ENCOUNTER
Received message that medication was charted for arthritis. Resent prescription with brachioradial pruritus and neurogenic pruritus.   Salma Loredo DO, MS  PGY-2  Dermatology  [facility]  Nov 1, 2023 1:31 PM

## 2023-11-19 PROBLEM — F45.8 NEUROGENIC PRURITUS: Status: ACTIVE | Noted: 2023-11-19

## 2023-11-30 ENCOUNTER — TELEPHONE (OUTPATIENT)
Dept: DERMATOLOGY | Facility: CLINIC | Age: 48
End: 2023-11-30
Payer: COMMERCIAL

## 2023-12-03 ENCOUNTER — ANCILLARY PROCEDURE (OUTPATIENT)
Dept: GENERAL RADIOLOGY | Facility: CLINIC | Age: 48
End: 2023-12-03
Attending: STUDENT IN AN ORGANIZED HEALTH CARE EDUCATION/TRAINING PROGRAM
Payer: COMMERCIAL

## 2023-12-03 ENCOUNTER — OFFICE VISIT (OUTPATIENT)
Dept: URGENT CARE | Facility: URGENT CARE | Age: 48
End: 2023-12-03
Payer: COMMERCIAL

## 2023-12-03 VITALS
SYSTOLIC BLOOD PRESSURE: 153 MMHG | OXYGEN SATURATION: 99 % | RESPIRATION RATE: 14 BRPM | DIASTOLIC BLOOD PRESSURE: 86 MMHG | HEART RATE: 63 BPM | TEMPERATURE: 97.3 F

## 2023-12-03 DIAGNOSIS — M25.572 PAIN IN JOINT, ANKLE AND FOOT, LEFT: ICD-10-CM

## 2023-12-03 DIAGNOSIS — M25.572 PAIN IN JOINT, ANKLE AND FOOT, LEFT: Primary | ICD-10-CM

## 2023-12-03 PROBLEM — U07.1 COVID-19: Status: ACTIVE | Noted: 2022-05-08

## 2023-12-03 PROCEDURE — 99213 OFFICE O/P EST LOW 20 MIN: CPT | Performed by: STUDENT IN AN ORGANIZED HEALTH CARE EDUCATION/TRAINING PROGRAM

## 2023-12-03 PROCEDURE — 73610 X-RAY EXAM OF ANKLE: CPT | Mod: TC | Performed by: RADIOLOGY

## 2023-12-04 NOTE — PROGRESS NOTES
Assessment & Plan     Pain in joint, ankle and foot, left  No acute fracture.  As patient has failed conservative treatment over the last 5 weeks, placed referral to sports medicine for assessment and possible advanced imaging.  Patient will refrain from ballet dancing until she is assessed.  She will continue using Voltaren gel, Tylenol, ibuprofen and supportive shoes until she is seen.  She was understanding and in agreement of the plan at the time of discharge.  - XR Ankle Left G/E 3 Views  - Orthopedic  Referral    Return for In clinic with your primary care provider.    Phuong Parson, DO  she/her  [facility] URGENT CARE    Subjective     Maria G Khan is a 48 year old female who presents to clinic today for the following health issues:    HPI    Calf rises at home 5 weeks ago  The next day at ballet class, was unable to point toe, go up on her toes  Ankle feels crunchy, soreness in the heel  Was using voltaren gel, tylenol and ibuprofen  Does not wear heels  No trauma  Was dancing 4x/week and stopped 5 weeks ago when the pain persisted  Bilateral bunion surgery at age 17  No known h/o bone disease    Past Medical History:   Diagnosis Date    MDD (major depressive disorder)     Seasonal allergies      Allergies   Allergen Reactions    Erythromycin Nausea     Current Outpatient Medications   Medication    cyclobenzaprine (FLEXERIL) 5 MG tablet    diphenhydrAMINE (BENADRYL) 25 MG tablet    fexofenadine (ALLEGRA) 180 MG tablet    ibuprofen (ADVIL/MOTRIN) 200 MG tablet    Multiple Vitamins-Minerals (MULTIVITAMIN ADULT PO)    norgestimate-ethinyl estradiol (ORTHO-CYCLEN) 0.25-35 MG-MCG tablet    sertraline (ZOLOFT) 100 MG tablet     Current Facility-Administered Medications   Medication    COVID-19 mRNA BIVALENT vaccine (PFIZER BOOSTER) 30 MCG/0.3ML injection 30 mcg      Review of Systems  Constitutional, HEENT, cardiovascular, pulmonary, gi and gu systems are negative, except as otherwise  noted.      Objective    BP (!) 153/86   Pulse 63   Temp 97.3  F (36.3  C) (Temporal)   Resp 14   SpO2 99%     Physical Exam  Cardiovascular:      Rate and Rhythm: Normal rate.   Musculoskeletal:      Left ankle: Swelling (1+) present. No deformity or ecchymosis. Tenderness present over the posterior TF ligament.      Left Achilles Tendon: Normal. Knox's test negative.      Comments: Pain with plantarflexion, external rotation   Feet:      Comments: No tenderness to palpation over medial or lateral malleolus, no pain over calcaneus  Neurological:      Mental Status: She is alert.      Sensory: Sensation is intact.          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 r o acute fracture COMPARISON: None.     IMPRESSION: No acute fracture is identified. There is normal joint spacing and alignment. The ankle mortise is congruent. The talar dome is unremarkable. There is an ankle joint effusion.         The use of Dragon/[employer] dictation services may have been used to construct the content in this note; any grammatical or spelling errors are non-intentional. Please contact the author of this note directly if you are in need of any clarification.

## 2023-12-04 NOTE — PATIENT INSTRUCTIONS
As we discussed, your x-ray was negative for any obvious foot or ankle fracture.  Given the timeline of your symptoms, it is probably best that you are assessed by sports medicine or orthopedic specialist and considered for advanced imaging.  I have placed a referral and you can call tomorrow morning to make an appointment.    Continue to use Tylenol, ibuprofen, Voltaren gel as needed.  Use heat or ice for symptom management.  I recommend wearing flat supportive shoes and holding off with ballet dancing until you are assessed by a specialist.

## 2023-12-05 NOTE — TELEPHONE ENCOUNTER
DIAGNOSIS: Pain in joint, ankle and foot, left / XR / Phuong Parson DO / [facility]     APPOINTMENT DATE: 12.6.23   NOTES STATUS DETAILS   OFFICE NOTE from referring provider Internal 12.3.23  Eb HOGAN   OFFICE NOTE from other specialist Internal 10.20.23  Betzaida HOGAN   MEDICATION LIST Internal    XRAYS (IMAGES & REPORTS) Internal 12.3.23  XR Ankle Left

## 2023-12-06 ENCOUNTER — PRE VISIT (OUTPATIENT)
Dept: ORTHOPEDICS | Facility: CLINIC | Age: 48
End: 2023-12-06

## 2023-12-06 ENCOUNTER — OFFICE VISIT (OUTPATIENT)
Dept: ORTHOPEDICS | Facility: CLINIC | Age: 48
End: 2023-12-06
Attending: STUDENT IN AN ORGANIZED HEALTH CARE EDUCATION/TRAINING PROGRAM
Payer: COMMERCIAL

## 2023-12-06 VITALS — HEIGHT: 64 IN | BODY MASS INDEX: 23.05 KG/M2 | WEIGHT: 135 LBS

## 2023-12-06 DIAGNOSIS — M76.72 PERONEAL TENDINITIS OF LEFT LOWER EXTREMITY: Primary | ICD-10-CM

## 2023-12-06 DIAGNOSIS — M20.22 HALLUX RIGIDUS OF LEFT FOOT: ICD-10-CM

## 2023-12-06 PROCEDURE — 99203 OFFICE O/P NEW LOW 30 MIN: CPT | Performed by: FAMILY MEDICINE

## 2023-12-06 NOTE — LETTER
"  12/6/2023      RE: Maria G Khan  [address]  [facility] [address]     Dear Colleague,    Thank you for referring your patient, Maria G Khan, to the [facility] SPORTS MEDICINE CLINIC [place]. Please see a copy of my visit note below.    Sports Medicine Clinic Visit    PCP: Patricia Long    Maria G Khan is a 48 year old female who is seen  in consultation at the request of Dr. Parson presenting for further evaluation of her left ankle over the posterior lateral aspect.     Bunionectomy, bilateral in 1992 (17 years old). End of 2016, left foot bone spur removal left great toe    Injury: patient reports she performed calf raises at home and the next day at [employer] class felt lateral ankle pain    Location of Pain: left ankle  Duration of Pain: 7 week(s)  Rating of Pain: 1/10  Pain is worse with: when trying to roll onto her toes or rolling back to a flat foot.   Additional Features: she practices [employer] for exercise, 4 times weekly (classes are about 1.5 hours).  Treatment so far consists of: Ice, Tylenol, Ibuprofen, Rest ankle brace, and topical Voltaren gel.       Pt works at a [employer].      Ht 1.626 m (5' 4\")   Wt 61.2 kg (135 lb)   BMI 23.17 kg/m        Patient has been a ballet dancer, for exercise and recreation.  Pain over left lateral foot/ankle    X-ray left ankle 12/3/2023 no fracture or periosteal reaction, normal mortise, no degenerative changes.  Images reviewed by me.      Imaging study below reviewed by me:  EXAM: XR ANKLE LEFT G/E 3 VIEWS  LOCATION: [facility]  DATE: 12/3/2023     INDICATION: ballet dancer with 5 weeks pain to palpation over L calcaneus, ATF. r o acute fracture  COMPARISON: None.                                                                      IMPRESSION: No acute fracture is identified. There is normal joint spacing and alignment. The ankle mortise is congruent. The talar dome is unremarkable. There is an ankle joint " effusion.        PMH:  Past Medical History:   Diagnosis Date    MDD (major depressive disorder)     Seasonal allergies        Active problem list:  Patient Active Problem List   Diagnosis    Abnormal Pap smear of cervix    MDD (major depressive disorder)    Seasonal allergies    Brachioradial pruritus    Skin cancer screening    Cherry angioma    Dermatofibroma    Neurogenic pruritus    Hyperlipidemia    COVID-19       FH:  Family History   Problem Relation Age of Onset    Breast Cancer Mother     Hypertension Father     Heart Failure Maternal Grandmother     Cerebrovascular Disease Paternal Grandmother     Dementia Other        SH:  Social History     Socioeconomic History    Marital status:      Spouse name: Not on file    Number of children: Not on file    Years of education: Not on file    Highest education level: Not on file   Occupational History    Not on file   Tobacco Use    Smoking status: Never    Smokeless tobacco: Never   Vaping Use    Vaping Use: Never used   Substance and Sexual Activity    Alcohol use: Yes    Drug use: Never    Sexual activity: Yes     Partners: Male     Birth control/protection: Pill   Other Topics Concern    Not on file   Social History Narrative    Not on file     Social Determinants of Health     Financial Resource Strain: Not on file   Food Insecurity: Not on file   Transportation Needs: Not on file   Physical Activity: Not on file   Stress: Not on file   Social Connections: Not on file   Interpersonal Safety: Not on file   Housing Stability: Not on file       MEDS:  See EMR, reviewed  ALL:  See EMR, reviewed    REVIEW OF SYSTEMS:  CONSTITUTIONAL:NEGATIVE for fever, chills, change in weight  INTEGUMENTARY/SKIN: NEGATIVE for worrisome rashes, moles or lesions  EYES: NEGATIVE for vision changes or irritation  ENT/MOUTH: NEGATIVE for ear, mouth and throat problems  RESP:NEGATIVE for significant cough or SOB  BREAST: NEGATIVE for masses, tenderness or discharge  CV: NEGATIVE  for chest pain, palpitations or peripheral edema  GI: NEGATIVE for nausea, abdominal pain, heartburn, or change in bowel habits  :NEGATIVE for frequency, dysuria, or hematuria  :NEGATIVE for frequency, dysuria, or hematuria  NEURO: NEGATIVE for weakness, dizziness or paresthesias  ENDOCRINE: NEGATIVE for temperature intolerance, skin/hair changes  HEME/ALLERGY/IMMUNE: NEGATIVE for bleeding problems  PSYCHIATRIC: NEGATIVE for changes in mood or affect      Objective: She points to the peroneal tendon at the lateral ankle as her area of discomfort.  She is tender along the peroneal tendon as it approaches her lateral malleolus, excursion is posterior to the malleolus and mildly as it approaches the fifth metatarsal.  She is nontender at the fifth metatarsal head.  She will plantarflex and tin against resistance with good strength.  Nontender at the Achilles tendon or posterior tibial tendon.  She has limited range of motion of the great toe to dorsiflexion and volar flexion compared to her nonaffected side.  She has a neutral heel with a neutral forefoot.  Overlying skin is intact.  Appropriate in conversation and affect.    Assessment: Hallux rigidus left foot.  Peroneal tendinitis.    Plan: She is done with the [employer] season and is planning on taking a few weeks away from [employer].  We discussed crosstraining options.  We discussed shoe with a supportive last underneath the great toe.  She would like to see physical therapy for a program that focuses on pelvifemoral alignment in the setting of dancing to help prevent peroneal tendinitis.  She declines the need for a cam walker boot at this time.  We discussed localized massage to the tendon.  She knows that nonsteroidal anti-inflammatories do not cause this to heal and cortisone shots are not recommended in this setting.  She had no further questions and will follow up with physical therapy.        Again, thank you for allowing me to participate in  the care of your patient.      Sincerely,    Rajinder Morejon MD

## 2023-12-06 NOTE — PATIENT INSTRUCTIONS
[facility] Outpatient Physical Therapy Locations    To schedule an appointment please call our scheduling department at [phone]    To fax a referral to be scheduled fax to [phone]    [place]: [address], [facility]: [address] [facility]: [address], [facility]: [address], [facility]: [address], [facility]: [address] [facility]: [address] [place], [place]  [place]: [address] [facility] , [address], [facility]: [address] [facility] , [address], [facility]: [address] [facility]: [address], [facility]: [address], [facility]: [address], [facility]: [address], [address], [facility]: [address] [facility] [facility]: [address], [facility]: [facility]: [address] [facility], [address], [facility]: [address], [facility]: [address], [place], [place]  [place]: [address] [facility]: [address] [place] [address]: [address], [facility]: [address] [facility] [address], [place]  [place]: [address], [facility]: [address], [facility]: [address] [facility] [place], [place]  [facility]: [address], [place]  [facility]: [address] [place] [address]  [place]: [address] [facility] [facility], [address], [facility] [address] [facility],  [facility]: [address] [facility] [place], [place]

## 2023-12-06 NOTE — PROGRESS NOTES
"Sports Medicine Clinic Visit    PCP: Patricia Long Erin is a 48 year old female who is seen  in consultation at the request of Dr. Parson presenting for further evaluation of her left ankle over the posterior lateral aspect.     Bunionectomy, bilateral in 1992 (17 years old). End of 2016, left foot bone spur removal left great toe    Injury: patient reports she performed calf raises at home and the next day at [employer] class felt lateral ankle pain    Location of Pain: left ankle  Duration of Pain: 7 week(s)  Rating of Pain: 1/10  Pain is worse with: when trying to roll onto her toes or rolling back to a flat foot.   Additional Features: she practices [employer] for exercise, 4 times weekly (classes are about 1.5 hours).  Treatment so far consists of: Ice, Tylenol, Ibuprofen, Rest ankle brace, and topical Voltaren gel.       Pt works at a [employer].      Ht 1.626 m (5' 4\")   Wt 61.2 kg (135 lb)   BMI 23.17 kg/m        Patient has been a ballet dancer, for exercise and recreation.  Pain over left lateral foot/ankle    X-ray left ankle 12/3/2023 no fracture or periosteal reaction, normal mortise, no degenerative changes.  Images reviewed by me.      Imaging study below reviewed by me:  EXAM: XR ANKLE LEFT G/E 3 VIEWS  LOCATION: [facility]  DATE: 12/3/2023     INDICATION: ballet dancer with 5 weeks pain to palpation over L calcaneus, ATF. r o acute fracture  COMPARISON: None.                                                                      IMPRESSION: No acute fracture is identified. There is normal joint spacing and alignment. The ankle mortise is congruent. The talar dome is unremarkable. There is an ankle joint effusion.        PMH:  Past Medical History:   Diagnosis Date    MDD (major depressive disorder)     Seasonal allergies        Active problem list:  Patient Active Problem List   Diagnosis    Abnormal Pap smear of cervix    MDD (major depressive disorder)    " Seasonal allergies    Brachioradial pruritus    Skin cancer screening    Cherry angioma    Dermatofibroma    Neurogenic pruritus    Hyperlipidemia    COVID-19       FH:  Family History   Problem Relation Age of Onset    Breast Cancer Mother     Hypertension Father     Heart Failure Maternal Grandmother     Cerebrovascular Disease Paternal Grandmother     Dementia Other        SH:  Social History     Socioeconomic History    Marital status:      Spouse name: Not on file    Number of children: Not on file    Years of education: Not on file    Highest education level: Not on file   Occupational History    Not on file   Tobacco Use    Smoking status: Never    Smokeless tobacco: Never   Vaping Use    Vaping Use: Never used   Substance and Sexual Activity    Alcohol use: Yes    Drug use: Never    Sexual activity: Yes     Partners: Male     Birth control/protection: Pill   Other Topics Concern    Not on file   Social History Narrative    Not on file     Social Determinants of Health     Financial Resource Strain: Not on file   Food Insecurity: Not on file   Transportation Needs: Not on file   Physical Activity: Not on file   Stress: Not on file   Social Connections: Not on file   Interpersonal Safety: Not on file   Housing Stability: Not on file       MEDS:  See EMR, reviewed  ALL:  See EMR, reviewed    REVIEW OF SYSTEMS:  CONSTITUTIONAL:NEGATIVE for fever, chills, change in weight  INTEGUMENTARY/SKIN: NEGATIVE for worrisome rashes, moles or lesions  EYES: NEGATIVE for vision changes or irritation  ENT/MOUTH: NEGATIVE for ear, mouth and throat problems  RESP:NEGATIVE for significant cough or SOB  BREAST: NEGATIVE for masses, tenderness or discharge  CV: NEGATIVE for chest pain, palpitations or peripheral edema  GI: NEGATIVE for nausea, abdominal pain, heartburn, or change in bowel habits  :NEGATIVE for frequency, dysuria, or hematuria  :NEGATIVE for frequency, dysuria, or hematuria  NEURO: NEGATIVE for weakness,  dizziness or paresthesias  ENDOCRINE: NEGATIVE for temperature intolerance, skin/hair changes  HEME/ALLERGY/IMMUNE: NEGATIVE for bleeding problems  PSYCHIATRIC: NEGATIVE for changes in mood or affect      Objective: She points to the peroneal tendon at the lateral ankle as her area of discomfort.  She is tender along the peroneal tendon as it approaches her lateral malleolus, excursion is posterior to the malleolus and mildly as it approaches the fifth metatarsal.  She is nontender at the fifth metatarsal head.  She will plantarflex and tin against resistance with good strength.  Nontender at the Achilles tendon or posterior tibial tendon.  She has limited range of motion of the great toe to dorsiflexion and volar flexion compared to her nonaffected side.  She has a neutral heel with a neutral forefoot.  Overlying skin is intact.  Appropriate in conversation and affect.    Assessment: Hallux rigidus left foot.  Peroneal tendinitis.    Plan: She is done with the [employer] season and is planning on taking a few weeks away from [employer].  We discussed crosstraining options.  We discussed shoe with a supportive last underneath the great toe.  She would like to see physical therapy for a program that focuses on pelvifemoral alignment in the setting of dancing to help prevent peroneal tendinitis.  She declines the need for a cam walker boot at this time.  We discussed localized massage to the tendon.  She knows that nonsteroidal anti-inflammatories do not cause this to heal and cortisone shots are not recommended in this setting.  She had no further questions and will follow up with physical therapy.

## 2023-12-08 ENCOUNTER — THERAPY VISIT (OUTPATIENT)
Dept: PHYSICAL THERAPY | Facility: CLINIC | Age: 48
End: 2023-12-08
Attending: FAMILY MEDICINE
Payer: COMMERCIAL

## 2023-12-08 DIAGNOSIS — M76.72 PERONEAL TENDINITIS OF LEFT LOWER EXTREMITY: ICD-10-CM

## 2023-12-08 DIAGNOSIS — M20.22 HALLUX RIGIDUS OF LEFT FOOT: ICD-10-CM

## 2023-12-08 PROCEDURE — 97110 THERAPEUTIC EXERCISES: CPT | Mod: GP

## 2023-12-08 PROCEDURE — 97161 PT EVAL LOW COMPLEX 20 MIN: CPT | Mod: GP

## 2023-12-08 NOTE — PROGRESS NOTES
"PHYSICAL THERAPY EVALUATION  Type of Visit: Evaluation    See electronic medical record for Abuse and Falls Screening details.    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 She notes around this time she was doing multiple shows, and wasn't. She notes achy pain in her Lateral ankle region with a pinching feeling along her peroneal tendon. Pain intensity is intermittent and not present with normal walking, but 3/10 with going down steps and curbs.      Presenting condition or subjective complaint: Foot/ankle pain in left foot  Date of onset: (P) 10/30/23    Relevant medical history: Arthritis; Depression; Dizziness; Hearing problems   Dates & types of surgery: 1992 (?) - double bunionectomy; 2016 - bone spur and bone buildup removal in left big toe joint + surgical screws removed from both feet    Prior diagnostic imaging/testing results: X-ray     Prior therapy history for the same diagnosis, illness or injury: No      Living Environment  Social support: With a significant other or spouse   Type of home: House   Stairs to enter the home: Yes 4 Is there a railing: Yes   Ramp:     Stairs inside the home: Yes 24 Is there a railing: Yes   Help at home: Self Cares (home health aide/personal care attendant, family, etc)  Equipment owned:       Employment: Yes   Hobbies/Interests: [employer]    Patient goals for therapy: [employer] class -- can't rise onto toes or roll down from toes       Objective   FOOT/ANKLE EVALUATION  POSTURE: WFL  GAIT: Noted mild increase in L lateral shift during L stance phase with decreased L toe off moment at terminal L stance phase  ROM: AROM WNL  AROM WFL  PROM WNL  PROM WFL  STRENGTH:  Noted specific impairments to L ankle PF and Eversion as well as specific MMT to L peroneal muscle strength impairments.  FLEXIBILITY: WNL  SPECIAL " TESTS:  Ligament stress tests WFL  FUNCTIONAL TESTS: Double Leg Squat: Anterior knee translation, Knee valgus, Hip internal rotation, and Improper use of glutes/hips  SLS: Mild impairments of L LE with increased deviation - still able to tolerate 30+ seconds though  PALPATION:  Noted tenderness to palpation to L peroneal tendons and muscle bellies, distal L lateral malleolus, and gastrocnemius musculature     Assessment & Plan   CLINICAL IMPRESSIONS  Medical Diagnosis: Peroneal tendinitis of left lower extremity  Hallux rigidus of left foot    Treatment Diagnosis: L foot/ankle pain with strength and motor control impairments.   Impression/Assessment: Patient is a 48 year old female with L foot/ankle pain complaints.  The following significant findings have been identified: Pain, Decreased joint mobility, Decreased strength, Impaired balance, Decreased proprioception, Impaired muscle performance, and Decreased activity tolerance. These impairments interfere with their ability to perform recreational activities as compared to previous level of function.     Clinical Decision Making (Complexity):  Clinical Presentation: Stable/Uncomplicated  Clinical Presentation Rationale: based on medical and personal factors listed in PT evaluation  Clinical Decision Making (Complexity): Low complexity    PLAN OF CARE  Treatment Interventions:  Interventions: Gait Training, Manual Therapy, Neuromuscular Re-education, Therapeutic Activity, Therapeutic Exercise, Self-Care/Home Management    Long Term Goals     PT Goal 1  Goal Identifier: (P) Ballet  Goal Description: (P) Pt will return to normal ballet activities with independence of function and strength in L LE.  Rationale: (P) to maximize safety and independence with performance of ADLs and functional tasks  Target Date: (P) 02/19/24      Frequency of Treatment: 1x/week  Duration of Treatment: (P) 10 weeks    Education Assessment:   Learner/Method:  Patient;Demonstration;Pictures/Video;No Barriers to Learning    Risks and benefits of evaluation/treatment have been explained.   Patient/Family/caregiver agrees with Plan of Care.     Evaluation Time:     PT Amaraal, Low Complexity Minutes (86168): (P) 15     Signing Clinician: JOI PAGE

## 2023-12-11 PROBLEM — M20.22 HALLUX RIGIDUS OF LEFT FOOT: Status: ACTIVE | Noted: 2023-12-11

## 2023-12-11 PROBLEM — M76.72 PERONEAL TENDINITIS OF LEFT LOWER EXTREMITY: Status: ACTIVE | Noted: 2023-12-11

## 2024-01-11 ENCOUNTER — THERAPY VISIT (OUTPATIENT)
Dept: PHYSICAL THERAPY | Facility: CLINIC | Age: 49
End: 2024-01-11
Payer: COMMERCIAL

## 2024-01-11 DIAGNOSIS — M20.22 HALLUX RIGIDUS OF LEFT FOOT: ICD-10-CM

## 2024-01-11 DIAGNOSIS — M76.72 PERONEAL TENDINITIS OF LEFT LOWER EXTREMITY: Primary | ICD-10-CM

## 2024-01-11 PROCEDURE — 97110 THERAPEUTIC EXERCISES: CPT | Mod: GP

## 2024-01-17 ENCOUNTER — OFFICE VISIT (OUTPATIENT)
Dept: AUDIOLOGY | Facility: CLINIC | Age: 49
End: 2024-01-17
Payer: COMMERCIAL

## 2024-01-17 ENCOUNTER — OFFICE VISIT (OUTPATIENT)
Dept: OTOLARYNGOLOGY | Facility: CLINIC | Age: 49
End: 2024-01-17
Attending: OTOLARYNGOLOGY
Payer: COMMERCIAL

## 2024-01-17 VITALS
TEMPERATURE: 97.2 F | SYSTOLIC BLOOD PRESSURE: 147 MMHG | WEIGHT: 139 LBS | HEART RATE: 57 BPM | DIASTOLIC BLOOD PRESSURE: 84 MMHG | BODY MASS INDEX: 23.73 KG/M2 | OXYGEN SATURATION: 99 % | HEIGHT: 64 IN

## 2024-01-17 DIAGNOSIS — H90.41 SENSORINEURAL HEARING LOSS (SNHL) OF RIGHT EAR WITH UNRESTRICTED HEARING OF LEFT EAR: Primary | ICD-10-CM

## 2024-01-17 DIAGNOSIS — H91.21 SUDDEN RIGHT HEARING LOSS: ICD-10-CM

## 2024-01-17 PROCEDURE — 99203 OFFICE O/P NEW LOW 30 MIN: CPT | Performed by: OTOLARYNGOLOGY

## 2024-01-17 PROCEDURE — 92550 TYMPANOMETRY & REFLEX THRESH: CPT | Performed by: AUDIOLOGIST

## 2024-01-17 PROCEDURE — 92557 COMPREHENSIVE HEARING TEST: CPT | Performed by: AUDIOLOGIST

## 2024-01-17 ASSESSMENT — PAIN SCALES - GENERAL: PAINLEVEL: NO PAIN (0)

## 2024-01-17 NOTE — PROGRESS NOTES
AUDIOLOGY REPORT    SUMMARY: Audiology visit completed. See audiogram for results.      RECOMMENDATIONS: Follow-up with ENT.    Padmini Tavera.  Licensed Audiologist  [place] # [id number]

## 2024-01-17 NOTE — NURSING NOTE
"Chief Complaint   Patient presents with    RECHECK     Follow up hearing loss     Blood pressure (!) 147/84, pulse 57, temperature 97.2  F (36.2  C), height 1.626 m (5' 4\"), weight 63 kg (139 lb), SpO2 99%.  Romel Romero LPN    "

## 2024-01-17 NOTE — PROGRESS NOTES
Maria G Khan is seen in consultation from Dr. Long. She is seen for right hearing loss which occurred May 17. She was seen in Urgent Care when she developed vertigo with nausea and vomiting. At that time she was diagnosed with fluid in the right middle ear and given Zofran and meclizine. She was seen several days later by her PCP who recommended she restart Allegra and put her on 10mg prednisone for 5 days. She did not find benefit and still could not hear on the right so an e-visit consult was placed to ENT. Audiogram was done June 22 which showed right severe sensorineural thresholds but 0% speech discrimination. MRI was ordered. She reports that the vertigo improved after a few days and she had residual imbalance for a week or so. However, she continues to have right hearing loss. Left hearing has seemed fine throughout. She never had otalgia or otorrhea on either side. She does not recall being sick prior to the episode. She does report that she gets cold sores.    Physical examination:  female in no acute distress.  Alert and answering questions appropriately.  HB 1/6 bilaterally.  Both ears examined with an otoscope. Ear canals clear, TMs intact with aerated middle ears.    Audiogram:  Audiogram done today was independently reviewed and compared to the audiogram from June. Right thresholds are in the severe range but with 0% speech discrimination, left normal hearing with 96% speech discrimination.  This is essentially unchanged from June.        Imaging: independently reviewed. MRI showed no enhancing lesions in either internal auditory canal or cerebellopontine angle.  MR brain 7/9/23  Impression:    1. No definite abnormality to explain patient's symptoms on the right side.  2. Questionable contrast enhancement within the left internal acoustic meatus, with indeterminate clinical significance. This is favored variational.  3. Mild leukoaraiosis.    Assessment and plan:    Maria G Khan is seen  for a right sudden sensorineural hearing loss in May. Unfortunately, her hearing loss was profound and has remained profound with 0% speech discrimination. MRI did not show any enhancing lesions. 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 She reports that she has gotten cold sores since she was a teenager. With her profound loss and associated vertigo, her recovery to a functional degree of hearing was likely poor. She was disappointed that she would likely not regain serviceable hearing.    We discussed hearing rehabilitation options including CROS hearing aid trial and cochlear implantation. She is interested in pursuing CROS hearing aid as she is having difficulty with neglect on the right. She works in one of the [employer] and it is fairly quiet in her work environment. She understands that cochlear implantation could be an option but would like to trial a CROS first.    Scribe Disclosure:   I, Marge Medina, am serving as a scribe; to document services personally performed by Lakeshia Jiménez MD -based on data collection and the provider's statements to me.     Provider Disclosure:  I agree with above History, Review of Systems, Physical exam and Plan.  I have reviewed the content of the documentation and have edited it as needed. I have personally performed the services documented here and the documentation accurately represents those services and the decisions I have made.

## 2024-01-17 NOTE — PATIENT INSTRUCTIONS
You were seen in the ENT Clinic today by Dr. Jiménez. If you have any questions or concerns after your appointment, please contact us (see below)      2.   A hearing aid consult will be placed.         How to Contact Us:  Send a [employer] message to your provider. Our team will respond to you via [employer]. Occasionally, we will need to call you to get further information.  For urgent matters (Monday-Friday), call the ENT Clinic: [phone] and speak with a call center team member - they will route your call appropriately.   If you'd like to speak directly with a nurse, please find our contact information below. We do our best to check voicemail frequently throughout the day, and will work to call you back within 1-2 days. For urgent matters, please use the general clinic phone numbers listed above.      Evelia LUDWIG RN  ENT RN Care Coordinator  Direct: [phone]    [place] SUÁREZ LPN  Direct: [phone]

## 2024-01-17 NOTE — LETTER
1/17/2024       RE: Maria G Khan  [address]  [facility] [address]     Dear Colleague,    Thank you for referring your patient, Maria G Khan, to the [facility] [place] [facility]. Please see a copy of my visit note below.    Maria G Khan is seen in consultation from Dr. Long. She is seen for right hearing loss which occurred May 17. She was seen in Urgent Care when she developed vertigo with nausea and vomiting. At that time she was diagnosed with fluid in the right middle ear and given Zofran and meclizine. She was seen several days later by her PCP who recommended she restart Allegra and put her on 10mg prednisone for 5 days. She did not find benefit and still could not hear on the right so an e-visit consult was placed to ENT. Audiogram was done June 22 which showed right severe sensorineural thresholds but 0% speech discrimination. MRI was ordered. She reports that the vertigo improved after a few days and she had residual imbalance for a week or so. However, she continues to have right hearing loss. Left hearing has seemed fine throughout. She never had otalgia or otorrhea on either side. She does not recall being sick prior to the episode. She does report that she gets cold sores.    Physical examination:  female in no acute distress.  Alert and answering questions appropriately.  HB 1/6 bilaterally.  Both ears examined with an otoscope. Ear canals clear, TMs intact with aerated middle ears.    Audiogram:  Audiogram done today was independently reviewed and compared to the audiogram from June. Right thresholds are in the severe range but with 0% speech discrimination, left normal hearing with 96% speech discrimination.  This is essentially unchanged from June.        Imaging: independently reviewed. MRI showed no enhancing lesions in either internal auditory canal or cerebellopontine angle.  MR brain  7/9/23  Impression:    1. No definite abnormality to explain patient's symptoms on the right side.  2. Questionable contrast enhancement within the left internal acoustic meatus, with indeterminate clinical significance. This is favored variational.  3. Mild leukoaraiosis.    Assessment and plan:    Maria G Khan is seen for a right sudden sensorineural hearing loss in May. Unfortunately, her hearing loss was profound and has remained profound with 0% speech discrimination. MRI did not show any enhancing lesions. 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 She reports that she has gotten cold sores since she was a teenager. With her profound loss and associated vertigo, her recovery to a functional degree of hearing was likely poor. She was disappointed that she would likely not regain serviceable hearing.    We discussed hearing rehabilitation options including CROS hearing aid trial and cochlear implantation. She is interested in pursuing CROS hearing aid as she is having difficulty with neglect on the right. She works in one of the [employer] and it is fairly quiet in her work environment. She understands that cochlear implantation could be an option but would like to trial a CROS first.    Scribe Disclosure:   I, Marge Medina, am serving as a scribe; to document services personally performed by Lakeshia Jiménez MD -based on data collection and the provider's statements to me.     Provider Disclosure:  I agree with above History, Review of Systems, Physical exam and Plan.  I have reviewed the content of the documentation and have edited it as needed. I have personally performed the services documented here and the documentation accurately represents those services and the decisions I have made.        Again, thank you for allowing me to participate in the care of your patient.      Sincerely,    Lakeshia Jiménez MD

## 2024-01-19 ENCOUNTER — THERAPY VISIT (OUTPATIENT)
Dept: PHYSICAL THERAPY | Facility: CLINIC | Age: 49
End: 2024-01-19
Payer: COMMERCIAL

## 2024-01-19 DIAGNOSIS — M76.72 PERONEAL TENDINITIS OF LEFT LOWER EXTREMITY: Primary | ICD-10-CM

## 2024-01-19 DIAGNOSIS — M20.22 HALLUX RIGIDUS OF LEFT FOOT: ICD-10-CM

## 2024-01-19 PROCEDURE — 97110 THERAPEUTIC EXERCISES: CPT | Mod: GP

## 2024-01-25 ENCOUNTER — THERAPY VISIT (OUTPATIENT)
Dept: PHYSICAL THERAPY | Facility: CLINIC | Age: 49
End: 2024-01-25
Payer: COMMERCIAL

## 2024-01-25 DIAGNOSIS — M20.22 HALLUX RIGIDUS OF LEFT FOOT: ICD-10-CM

## 2024-01-25 DIAGNOSIS — M76.72 PERONEAL TENDINITIS OF LEFT LOWER EXTREMITY: Primary | ICD-10-CM

## 2024-01-25 PROCEDURE — 97110 THERAPEUTIC EXERCISES: CPT | Mod: GP

## 2024-02-07 ENCOUNTER — THERAPY VISIT (OUTPATIENT)
Dept: PHYSICAL THERAPY | Facility: CLINIC | Age: 49
End: 2024-02-07
Payer: COMMERCIAL

## 2024-02-07 DIAGNOSIS — M20.22 HALLUX RIGIDUS OF LEFT FOOT: ICD-10-CM

## 2024-02-07 DIAGNOSIS — M76.72 PERONEAL TENDINITIS OF LEFT LOWER EXTREMITY: Primary | ICD-10-CM

## 2024-02-07 PROCEDURE — 97140 MANUAL THERAPY 1/> REGIONS: CPT | Mod: GP

## 2024-02-07 PROCEDURE — 97110 THERAPEUTIC EXERCISES: CPT | Mod: GP

## 2024-02-16 ENCOUNTER — THERAPY VISIT (OUTPATIENT)
Dept: PHYSICAL THERAPY | Facility: CLINIC | Age: 49
End: 2024-02-16
Payer: COMMERCIAL

## 2024-02-16 DIAGNOSIS — M76.72 PERONEAL TENDINITIS OF LEFT LOWER EXTREMITY: Primary | ICD-10-CM

## 2024-02-16 DIAGNOSIS — M20.22 HALLUX RIGIDUS OF LEFT FOOT: ICD-10-CM

## 2024-02-16 PROCEDURE — 97110 THERAPEUTIC EXERCISES: CPT | Mod: GP

## 2024-02-16 PROCEDURE — 97140 MANUAL THERAPY 1/> REGIONS: CPT | Mod: GP

## 2024-03-01 ENCOUNTER — THERAPY VISIT (OUTPATIENT)
Dept: PHYSICAL THERAPY | Facility: CLINIC | Age: 49
End: 2024-03-01
Payer: COMMERCIAL

## 2024-03-01 DIAGNOSIS — M20.22 HALLUX RIGIDUS OF LEFT FOOT: ICD-10-CM

## 2024-03-01 DIAGNOSIS — M76.72 PERONEAL TENDINITIS OF LEFT LOWER EXTREMITY: Primary | ICD-10-CM

## 2024-03-01 PROCEDURE — 97140 MANUAL THERAPY 1/> REGIONS: CPT | Mod: GP | Performed by: PHYSICAL THERAPIST

## 2024-03-01 PROCEDURE — 97110 THERAPEUTIC EXERCISES: CPT | Mod: GP | Performed by: PHYSICAL THERAPIST

## 2024-03-07 ENCOUNTER — OFFICE VISIT (OUTPATIENT)
Dept: AUDIOLOGY | Facility: CLINIC | Age: 49
End: 2024-03-07
Payer: COMMERCIAL

## 2024-03-07 DIAGNOSIS — H90.41 SENSORINEURAL HEARING LOSS (SNHL) OF RIGHT EAR WITH UNRESTRICTED HEARING OF LEFT EAR: Primary | ICD-10-CM

## 2024-03-07 DIAGNOSIS — H91.21 SUDDEN RIGHT HEARING LOSS: ICD-10-CM

## 2024-03-07 PROCEDURE — 92590 PR HEARING AID EXAM MONAURAL: CPT | Performed by: AUDIOLOGIST

## 2024-03-07 NOTE — PROGRESS NOTES
AUDIOLOGY REPORT    SUBJECTIVE: Maria G Khan is a 48 year old female who was seen in the Audiology Clinic at [facility] on 3/7/2024 to discuss concerns with hearing and functional communication difficulties. History is significant for a right sudden sensorineural hearing loss in May 2023. Maria G has been seen previously on 1/17/2024, and results revealed normal hearing with 100% word recognition for the left ear and moderate sloping to profound sensorineural hearing loss with 0% word recognition for the right ear. The patient was medically evaluated and determined to be cleared for a contralateral routing of signal (CROS) hearing aid system, osseointegrated hearing implant, or a right cochlear implant by Lakeshia Jiménez M.D.    OBJECTIVE: Discussed the differences between a CROS hearing aid system versus osseointegrated hearing implant versus a right cochlear implant. Maria G has chosen to move forward with a non-surgical option at this time. Discussed styles, levels of technology, iPhone compatibility/apps, and rechargeable options.    The hearing aids mutually chosen were:  BILATERAL: LEFT: ReSound Nexia 5 - R hearing aid RIGT: ReSound CROS - R transmitter  COLOR: 74  BATTERY SIZE: Rechargeable  EARMOLD/TIPS: Medium open domes  CANAL/ LENGTH: 1 (M&JHON)    ASSESSMENT: Reviewed purchase information and warranty information with the patient. The 45 day trial period was explained to the patient. The patient was given a copy of the [place] Department [employer] consumer brochure on purchasing hearing instruments. Patient risk factors have been provided to the patient in writing prior to the sale of the hearing aid per FDA regulation. The risk factors are also available in the User Instructional Booklet to be presented on the day of the hearing aid fitting. Hearing aids ordered.     PLAN: Maria G is scheduled to return in 3-4 weeks for a hearing aid fitting and  programming. Purchase agreement will be completed on that date. Please contact this clinic with any questions or concerns.      Cintia [place], [facility]  Licensed Audiologist  [place] #[id number]

## 2024-03-21 ENCOUNTER — THERAPY VISIT (OUTPATIENT)
Dept: PHYSICAL THERAPY | Facility: CLINIC | Age: 49
End: 2024-03-21
Payer: COMMERCIAL

## 2024-03-21 DIAGNOSIS — M76.72 PERONEAL TENDINITIS OF LEFT LOWER EXTREMITY: Primary | ICD-10-CM

## 2024-03-21 DIAGNOSIS — M20.22 HALLUX RIGIDUS OF LEFT FOOT: ICD-10-CM

## 2024-03-21 PROCEDURE — 97110 THERAPEUTIC EXERCISES: CPT | Mod: GP

## 2024-03-21 PROCEDURE — 97140 MANUAL THERAPY 1/> REGIONS: CPT | Mod: GP

## 2024-04-11 ENCOUNTER — OFFICE VISIT (OUTPATIENT)
Dept: URGENT CARE | Facility: URGENT CARE | Age: 49
End: 2024-04-11
Payer: COMMERCIAL

## 2024-04-11 VITALS
OXYGEN SATURATION: 100 % | HEIGHT: 64 IN | SYSTOLIC BLOOD PRESSURE: 120 MMHG | RESPIRATION RATE: 15 BRPM | WEIGHT: 128 LBS | BODY MASS INDEX: 21.85 KG/M2 | TEMPERATURE: 97.3 F | HEART RATE: 67 BPM | DIASTOLIC BLOOD PRESSURE: 80 MMHG

## 2024-04-11 DIAGNOSIS — R05.1 ACUTE COUGH: ICD-10-CM

## 2024-04-11 DIAGNOSIS — J01.40 ACUTE NON-RECURRENT PANSINUSITIS: Primary | ICD-10-CM

## 2024-04-11 DIAGNOSIS — H66.91 RIGHT ACUTE OTITIS MEDIA: ICD-10-CM

## 2024-04-11 PROCEDURE — 99213 OFFICE O/P EST LOW 20 MIN: CPT | Performed by: STUDENT IN AN ORGANIZED HEALTH CARE EDUCATION/TRAINING PROGRAM

## 2024-04-11 RX ORDER — BENZONATATE 100 MG/1
100 CAPSULE ORAL 3 TIMES DAILY PRN
Qty: 30 CAPSULE | Refills: 0 | Status: SHIPPED | OUTPATIENT
Start: 2024-04-11 | End: 2024-09-12

## 2024-04-11 NOTE — PROGRESS NOTES
ASSESSMENT & PLAN:   Diagnoses and all orders for this visit:  Acute non-recurrent pansinusitis  -     amoxicillin-clavulanate (AUGMENTIN) 875-125 MG tablet; Take 1 tablet by mouth 2 times daily for 7 days  Right acute otitis media  -     amoxicillin-clavulanate (AUGMENTIN) 875-125 MG tablet; Take 1 tablet by mouth 2 times daily for 7 days  Acute cough  -     benzonatate (TESSALON) 100 MG capsule; Take 1 capsule (100 mg) by mouth 3 times daily as needed for cough    URI symptoms x 1 week including cough and congestion. She has clear lung sounds, O2 100%, afebrile. Has right AOM on exam with no otalgia. With duration of symptoms, will cover for sinusitis and right AOM with Augmentin x 7 days. Recommend Flonase for postnasal drip. Tessalon as needed for cough.    At the end of the encounter, I discussed results, diagnosis, medications. Discussed red flags for immediate return to clinic/ER, as well as indications for follow up if no improvement. Patient and/or caregiver understood and agreed to plan. Patient was stable for discharge.    There are no Patient Instructions on file for this visit.    ------------------------------------------------------------------------  SUBJECTIVE  History was obtained from patient.    Patient presents with:  Nasal Congestion: X6 days of congestion   Cough: X6 days of a productive cough   Urgent Care    HPI  Maria G Khan is a(n) 49 year old female presenting to urgent care for URI symptoms x 1 week. Reports cough, congestion, rhinorrhea. Cough is productive with mucus. Initially had scratchy throat which has resolved. No fever, chest pain, shortness of breath. No history of asthma or COPD.    Review of Systems    Current Outpatient Medications   Medication Sig Dispense Refill    amoxicillin-clavulanate (AUGMENTIN) 875-125 MG tablet Take 1 tablet by mouth 2 times daily for 7 days 14 tablet 0    benzonatate (TESSALON) 100 MG capsule Take 1 capsule (100 mg) by mouth 3 times daily as  "needed for cough 30 capsule 0    cyclobenzaprine (FLEXERIL) 5 MG tablet Take 1-2 tablets (5-10 mg) by mouth 3 times daily as needed for muscle spasms 30 tablet 0    diphenhydrAMINE (BENADRYL) 25 MG tablet Take 25 mg by mouth every 6 hours as needed for itching or allergies      fexofenadine (ALLEGRA) 180 MG tablet Take 180 mg by mouth daily      ibuprofen (ADVIL/MOTRIN) 200 MG tablet Take 200 mg by mouth every 4 hours as needed for pain      Multiple Vitamins-Minerals (MULTIVITAMIN ADULT PO)       norgestimate-ethinyl estradiol (ORTHO-CYCLEN) 0.25-35 MG-MCG tablet Take 1 tablet by mouth daily 84 tablet 3    sertraline (ZOLOFT) 100 MG tablet Take 1 tablet (100 mg) by mouth daily 90 tablet 3     Problem List:  2023-12: Peroneal tendinitis of left lower extremity  2023-12: Hallux rigidus of left foot  2023-11: Neurogenic pruritus  2022-05: COVID-19  2021-09: Brachioradial pruritus  2021-09: Skin cancer screening  2021-09: Cherry angioma  2021-09: Dermatofibroma  2020-07: Abnormal Pap smear of cervix  2014-07: Hyperlipidemia  MDD (major depressive disorder)  Seasonal allergies    Allergies   Allergen Reactions    Erythromycin Nausea    Diclofenac Sodium Hives and Itching         OBJECTIVE  Vitals:    04/11/24 1440   BP: 120/80   Pulse: 67   Resp: 15   Temp: 97.3  F (36.3  C)   TempSrc: Temporal   SpO2: 100%   Weight: 58.1 kg (128 lb)   Height: 1.626 m (5' 4\")     Physical Exam   GENERAL: healthy, alert, no acute distress.   PSYCH: mentation appears normal. Normal affect  HEAD: normocephalic, atraumatic.  EYE: PERRL. EOMs intact. No scleral injection bilaterally.   EAR: external ear normal. Bilateral ear canals normal and nonpainful. Right TM bulging with purulent effusion. Left TM intact, pearly, translucent without bulging.  NOSE: external nose atraumatic without lesions.  OROPHARYNX: moist mucous membranes. Posterior oropharynx without erythema or exudate. Uvula midline. Patent airway..   LUNGS: no increased work of " breathing. Clear lung sounds bilaterally. No wheezing, rhonchi, or rales.   CV: regular rate and rhythm. No clicks, murmurs, or rubs.    No results found for any visits on 04/11/24.

## 2024-04-12 ENCOUNTER — THERAPY VISIT (OUTPATIENT)
Dept: PHYSICAL THERAPY | Facility: CLINIC | Age: 49
End: 2024-04-12
Payer: COMMERCIAL

## 2024-04-12 DIAGNOSIS — M76.72 PERONEAL TENDINITIS OF LEFT LOWER EXTREMITY: Primary | ICD-10-CM

## 2024-04-12 DIAGNOSIS — M20.22 HALLUX RIGIDUS OF LEFT FOOT: ICD-10-CM

## 2024-04-12 PROCEDURE — 97110 THERAPEUTIC EXERCISES: CPT | Mod: GP

## 2024-04-25 ENCOUNTER — OFFICE VISIT (OUTPATIENT)
Dept: AUDIOLOGY | Facility: CLINIC | Age: 49
End: 2024-04-25
Payer: COMMERCIAL

## 2024-04-25 DIAGNOSIS — H90.41 SENSORINEURAL HEARING LOSS (SNHL) OF RIGHT EAR WITH UNRESTRICTED HEARING OF LEFT EAR: Primary | ICD-10-CM

## 2024-04-25 PROCEDURE — V5200 DISP FEE CONTRALATERAL MONAU: HCPCS | Performed by: AUDIOLOGIST

## 2024-04-25 PROCEDURE — V5181 HEARING AID MONAURAL BTE: HCPCS | Performed by: AUDIOLOGIST

## 2024-04-25 PROCEDURE — V5020 CONFORMITY EVALUATION: HCPCS | Performed by: AUDIOLOGIST

## 2024-04-25 PROCEDURE — V5011 HEARING AID FITTING/CHECKING: HCPCS | Performed by: AUDIOLOGIST

## 2024-04-25 NOTE — PROGRESS NOTES
AUDIOLOGY REPORT    SUBJECTIVE: Maria G Khan is a 49 year old female who was seen in the Audiology Clinic at [facility] for fitting of a left ReSound Nexia 5 - R hearing aid and a right ReSound Nexia CROS transmitter. History is significant for a right sudden sensorineural hearing loss in May 2023. Maria G has been seen previously on 1/17/2024, and results revealed normal hearing with 100% word recognition for the left ear and moderate sloping to profound sensorineural hearing loss with 0% word recognition for the right ear. The patient was given medical clearance to pursue amplification by Lakeshia Jiménez M.D.   OBJECTIVE: The hearing aid conformity evaluation was completed.The hearing aids were placed and provided a good fit. Simulated real-ear measurements were completed on the Sweeten system for the left hearing aid and were a good match to NAL-NL1 targets with soft sounds audible, moderate sounds comfortable, and loud sounds below discomfort. UCLs are verified through maximum power output measures and demonstrate appropriate limiting of loud inputs. Maria G was oriented to proper hearing aid use, care, cleaning (no water, dry brush), batteries (size: rechargeable, low-battery signal), aid insertion/removal, user booklet, warranty information, storage cases, and other hearing aid details. The patient confirmed understanding of hearing aid use and care and showed proper insertion of the hearing aids while in the office today. Maria G reported good volume and sound quality.   Hearing aids were programmed as follows:  Program 1: All-Around  Program 2: Hear-in-noise  Program button and volume control were deactivated. The hearing aids were paired to her iPhone and the LearnUp abigail. Use was reviewed.    EARS FIT: Bilateral  HEARING AID MODEL NAME: Left: ReSound Nexia 5 - R hearing aids Right: ReSound Nexia CROS transmitter  HEARING AID STYLE: -in-the-ear  behind-the-ear  EARMOLDS/TIP: Small open domes  SERIAL NUMBERS: Left: [id number] Right: [id number]  WARRANTY END DATE: 4/8/2028    ASSESSMENT: A left ReSound Nexia 5 - R hearing aid and a right ReSound Nexia CROS transmitter were fit today. Verification measures were performed. Maria G signed the Hearing Aid Purchase Agreement and was given a copy, as well as details on her hearing aids. The patient was counseled that exact out of pocket amounts cannot be determined for hearing aid claims being sent to insurance. Any insurance coverage information presented to the patient is an estimate only, and is not a guarantee of payment. The patient has been advised to check with their own insurance.    PLAN: Maria G will return for follow-up in 2-3 weeks for a hearing aid review appointment. Please call this clinic with questions regarding today s appointment.    Cintia Jackson, [facility]  Licensed Audiologist  [place] #[id number]

## 2024-05-03 ENCOUNTER — THERAPY VISIT (OUTPATIENT)
Dept: PHYSICAL THERAPY | Facility: CLINIC | Age: 49
End: 2024-05-03
Payer: COMMERCIAL

## 2024-05-03 DIAGNOSIS — M76.72 PERONEAL TENDINITIS OF LEFT LOWER EXTREMITY: Primary | ICD-10-CM

## 2024-05-03 DIAGNOSIS — M20.22 HALLUX RIGIDUS OF LEFT FOOT: ICD-10-CM

## 2024-05-03 PROCEDURE — 97110 THERAPEUTIC EXERCISES: CPT | Mod: GP | Performed by: PHYSICAL THERAPIST

## 2024-05-03 PROCEDURE — 97530 THERAPEUTIC ACTIVITIES: CPT | Mod: GP | Performed by: PHYSICAL THERAPIST

## 2024-05-09 ENCOUNTER — THERAPY VISIT (OUTPATIENT)
Dept: PHYSICAL THERAPY | Facility: CLINIC | Age: 49
End: 2024-05-09
Payer: COMMERCIAL

## 2024-05-09 DIAGNOSIS — M76.72 PERONEAL TENDINITIS OF LEFT LOWER EXTREMITY: Primary | ICD-10-CM

## 2024-05-09 DIAGNOSIS — M20.22 HALLUX RIGIDUS OF LEFT FOOT: ICD-10-CM

## 2024-05-09 PROCEDURE — 97110 THERAPEUTIC EXERCISES: CPT | Mod: GP | Performed by: PHYSICAL THERAPIST

## 2024-05-09 PROCEDURE — 97112 NEUROMUSCULAR REEDUCATION: CPT | Mod: GP | Performed by: PHYSICAL THERAPIST

## 2024-05-09 PROCEDURE — 97140 MANUAL THERAPY 1/> REGIONS: CPT | Mod: GP | Performed by: PHYSICAL THERAPIST

## 2024-05-15 ENCOUNTER — THERAPY VISIT (OUTPATIENT)
Dept: PHYSICAL THERAPY | Facility: CLINIC | Age: 49
End: 2024-05-15
Payer: COMMERCIAL

## 2024-05-15 DIAGNOSIS — M76.72 PERONEAL TENDINITIS OF LEFT LOWER EXTREMITY: Primary | ICD-10-CM

## 2024-05-15 DIAGNOSIS — M20.22 HALLUX RIGIDUS OF LEFT FOOT: ICD-10-CM

## 2024-05-15 PROCEDURE — 97110 THERAPEUTIC EXERCISES: CPT | Mod: GP | Performed by: PHYSICAL THERAPIST

## 2024-05-15 PROCEDURE — 97140 MANUAL THERAPY 1/> REGIONS: CPT | Mod: GP | Performed by: PHYSICAL THERAPIST

## 2024-05-16 ENCOUNTER — OFFICE VISIT (OUTPATIENT)
Dept: AUDIOLOGY | Facility: CLINIC | Age: 49
End: 2024-05-16
Payer: COMMERCIAL

## 2024-05-16 DIAGNOSIS — H90.41 SENSORINEURAL HEARING LOSS (SNHL) OF RIGHT EAR WITH UNRESTRICTED HEARING OF LEFT EAR: Primary | ICD-10-CM

## 2024-05-16 PROCEDURE — 99207 PR ASSESSMENT FOR HEARING AID: CPT | Performed by: AUDIOLOGIST

## 2024-05-16 NOTE — PROGRESS NOTES
AUDIOLOGY REPORT    SUBJECTIVE: Maria G Khan is a 49 year old female who was seen in the Audiology Clinic at [facility] on 5/16/2024 for a follow-up check regarding the fitting of new hearing aids. History is significant for a right sudden sensorineural hearing loss in May 2023. Maria G has been seen previously on 1/17/2024, and results revealed normal hearing with 100% word recognition for the left ear and moderate sloping to profound sensorineural hearing loss with 0% word recognition for the right ear. The patient has been seen previously in this clinic and was fit with a left ReSound Nexia 5 - R hearing aid and a right ReSound Nexia CROS transmitter on 4/25/2024. Maria G reports she has seen good benefit. She notes she has been turning the left hearing aid down and the right CROS transmitter up via the abigail.    OBJECTIVE: Based on patient report, the following changes were made: Mimicked the patient's changes in the abigail by decreasing overall gain for the left hearing aid by 5 dB. The CROS balance was moved towards the CROS picking up more sound. Following these changes, Maria G reported good volume and sound quality.    Reviewed 45 day trial period, care, cleaning (no water, dry brush), batteries (size: rechargeable, low-battery signal), aid insertion/removal, volume adjustment (if applicable), user booklet, warranty information, storage cases, and other hearing aid details.     ASSESSMENT: A follow-up appointment for hearing aid fitting was completed today. Changes made as outlined above. No charge visit, as the hearing aids are in warranty.    PLAN: Maria G will return for follow-up as needed, or at least every 9-12 months for cleaning and assessment of the hearing aids. Please call this clinic with any questions regarding today s appointment.      Cintia Jackson, [facility]  Licensed Audiologist  [place] #[id number]

## 2024-07-12 ENCOUNTER — THERAPY VISIT (OUTPATIENT)
Dept: PHYSICAL THERAPY | Facility: CLINIC | Age: 49
End: 2024-07-12
Payer: COMMERCIAL

## 2024-07-12 DIAGNOSIS — M76.72 PERONEAL TENDINITIS OF LEFT LOWER EXTREMITY: Primary | ICD-10-CM

## 2024-07-12 DIAGNOSIS — M20.22 HALLUX RIGIDUS OF LEFT FOOT: ICD-10-CM

## 2024-07-12 PROCEDURE — 97110 THERAPEUTIC EXERCISES: CPT | Mod: GP | Performed by: PHYSICAL THERAPIST

## 2024-07-12 PROCEDURE — 97140 MANUAL THERAPY 1/> REGIONS: CPT | Mod: GP | Performed by: PHYSICAL THERAPIST

## 2024-08-01 ENCOUNTER — THERAPY VISIT (OUTPATIENT)
Dept: PHYSICAL THERAPY | Facility: CLINIC | Age: 49
End: 2024-08-01
Payer: COMMERCIAL

## 2024-08-01 DIAGNOSIS — M76.72 PERONEAL TENDINITIS OF LEFT LOWER EXTREMITY: Primary | ICD-10-CM

## 2024-08-01 DIAGNOSIS — M20.22 HALLUX RIGIDUS OF LEFT FOOT: ICD-10-CM

## 2024-08-01 PROCEDURE — 97112 NEUROMUSCULAR REEDUCATION: CPT | Mod: GP | Performed by: PHYSICAL THERAPIST

## 2024-08-01 PROCEDURE — 97110 THERAPEUTIC EXERCISES: CPT | Mod: GP | Performed by: PHYSICAL THERAPIST

## 2024-08-01 PROCEDURE — 97140 MANUAL THERAPY 1/> REGIONS: CPT | Mod: GP | Performed by: PHYSICAL THERAPIST

## 2024-08-08 ENCOUNTER — OFFICE VISIT (OUTPATIENT)
Dept: URGENT CARE | Facility: URGENT CARE | Age: 49
End: 2024-08-08
Payer: COMMERCIAL

## 2024-08-08 VITALS
TEMPERATURE: 97.2 F | SYSTOLIC BLOOD PRESSURE: 158 MMHG | HEART RATE: 59 BPM | OXYGEN SATURATION: 95 % | DIASTOLIC BLOOD PRESSURE: 92 MMHG

## 2024-08-08 DIAGNOSIS — N76.0 BACTERIAL VAGINOSIS: ICD-10-CM

## 2024-08-08 DIAGNOSIS — R30.0 DYSURIA: Primary | ICD-10-CM

## 2024-08-08 DIAGNOSIS — B96.89 BACTERIAL VAGINOSIS: ICD-10-CM

## 2024-08-08 LAB
BACTERIA #/AREA URNS HPF: ABNORMAL /HPF
CLUE CELLS: PRESENT
RBC #/AREA URNS AUTO: ABNORMAL /HPF
SQUAMOUS #/AREA URNS AUTO: ABNORMAL /LPF
TRICHOMONAS, WET PREP: ABNORMAL
WBC #/AREA URNS AUTO: ABNORMAL /HPF
WBC'S/HIGH POWER FIELD, WET PREP: ABNORMAL
YEAST, WET PREP: ABNORMAL

## 2024-08-08 PROCEDURE — 87186 SC STD MICRODIL/AGAR DIL: CPT | Performed by: FAMILY MEDICINE

## 2024-08-08 PROCEDURE — 81015 MICROSCOPIC EXAM OF URINE: CPT | Performed by: FAMILY MEDICINE

## 2024-08-08 PROCEDURE — 87210 SMEAR WET MOUNT SALINE/INK: CPT | Performed by: FAMILY MEDICINE

## 2024-08-08 PROCEDURE — 99214 OFFICE O/P EST MOD 30 MIN: CPT | Performed by: FAMILY MEDICINE

## 2024-08-08 PROCEDURE — 87086 URINE CULTURE/COLONY COUNT: CPT | Performed by: FAMILY MEDICINE

## 2024-08-08 RX ORDER — METRONIDAZOLE 500 MG/1
500 TABLET ORAL 2 TIMES DAILY
Qty: 14 TABLET | Refills: 0 | Status: SHIPPED | OUTPATIENT
Start: 2024-08-08 | End: 2024-08-15

## 2024-08-08 RX ORDER — NITROFURANTOIN 25; 75 MG/1; MG/1
100 CAPSULE ORAL 2 TIMES DAILY
Qty: 10 CAPSULE | Refills: 0 | Status: SHIPPED | OUTPATIENT
Start: 2024-08-08 | End: 2024-08-13

## 2024-08-08 NOTE — PATIENT INSTRUCTIONS
Nitrofurantoin (Macrobid) antibiotic start now to treat for presumed bladder infection take this twice a day for 5 days      Get the results of urine culture within 2 to 3 days we will call you if the therapy needs to be changed      If your symptoms worsen such as having fever, flank pain or worsening symptoms please seek help right away      If you develop increased vaginal discharge or fishy smell or itching then start the metronidazole medication  
Patient

## 2024-08-08 NOTE — PROGRESS NOTES
Assessment & Plan     Dysuria  - Wet prep - Clinic Collect  - UA Microscopic with Reflex to Culture    Bacterial vaginosis     Given patient does have symptoms of UTI we will go ahead and start with Macrobid therapy despite there being few white blood cells on micro.  UA was not valid as she is taking Pyridium at this time.  Low suspicion for pyelonephritis. Manual urine culture ordered      Metronidazole made available in the event vaginal symptoms develop consistent with infection    See AVS summary for additional recommendations reviewed with patient during this visit.         30 minutes spent on the date of the encounter doing chart review, history and exam, documentation and further activities per the note.     Gatito Culp MD   [facility]    Subjective     Maria G is a 49 year old female who presents to clinic today for the following health issues:  Chief Complaint   Patient presents with    Urgent Care     - Started Yesterday  - Dysuria  - Frequency  - Retention   - AZO for symptoms      HPI    Patient noting that when she urinates she does not feel like she has completed her elimination she denies any fevers or back pain.  Her last bladder infection was 2 months ago.  Prior to this her last bladder infection was more than a year ago.  She has not seen any blood in her urine.  She denies any vaginal discharge or vaginal itching.    Patient Active Problem List    Diagnosis Date Noted    Peroneal tendinitis of left lower extremity 12/11/2023     Priority: Medium    Hallux rigidus of left foot 12/11/2023     Priority: Medium    Neurogenic pruritus 11/19/2023     Priority: Medium    COVID-19 05/08/2022     Priority: Medium    Brachioradial pruritus 09/25/2021     Priority: Medium    Skin cancer screening 09/25/2021     Priority: Medium    Cherry angioma 09/25/2021     Priority: Medium    Dermatofibroma 09/25/2021     Priority: Medium    Abnormal Pap smear of cervix 07/07/2020     Priority: Medium  "    7/7/2020 office notes, \"She has history of abnormal pap, advised to repeat pap 06/2020. Last pap 06/2019 was normal. JOSE ALEJANDRO obtained.\"    6/10/2019 NIL pap, neg HPV (per abstraction)  7/7/2020 NIL pap, neg HR HPV. Plan cotest in 3 years  9/11/23 NIL, Neg HPV. Plan 5 yr co-test per visit notes      MDD (major depressive disorder)      Priority: Medium    Seasonal allergies      Priority: Medium    Hyperlipidemia 07/31/2014     Priority: Medium       Current Outpatient Medications   Medication Sig Dispense Refill    benzonatate (TESSALON) 100 MG capsule Take 1 capsule (100 mg) by mouth 3 times daily as needed for cough 30 capsule 0    cyclobenzaprine (FLEXERIL) 5 MG tablet Take 1-2 tablets (5-10 mg) by mouth 3 times daily as needed for muscle spasms 30 tablet 0    diphenhydrAMINE (BENADRYL) 25 MG tablet Take 25 mg by mouth every 6 hours as needed for itching or allergies      fexofenadine (ALLEGRA) 180 MG tablet Take 180 mg by mouth daily      ibuprofen (ADVIL/MOTRIN) 200 MG tablet Take 200 mg by mouth every 4 hours as needed for pain      Multiple Vitamins-Minerals (MULTIVITAMIN ADULT PO)       norgestimate-ethinyl estradiol (ORTHO-CYCLEN) 0.25-35 MG-MCG tablet Take 1 tablet by mouth daily 84 tablet 3    sertraline (ZOLOFT) 100 MG tablet Take 1 tablet (100 mg) by mouth daily 90 tablet 3     Current Facility-Administered Medications   Medication Dose Route Frequency Provider Last Rate Last Admin    COVID-19 mRNA BIVALENT vaccine (PFIZER BOOSTER) 30 MCG/0.3ML injection 30 mcg  30 mcg Intramuscular Once Mulu Cassidy MD               Objective    BP (!) 158/92   Pulse 59   Temp 97.2  F (36.2  C) (Temporal)   SpO2 95%   Physical Exam       GEN: NAD, normal mentation  Results for orders placed or performed in visit on 08/08/24   UA Microscopic with Reflex to Culture     Status: Abnormal   Result Value Ref Range    Bacteria Urine Few (A) None Seen /HPF    RBC Urine 0-2 0-2 /HPF /HPF    WBC Urine 0-5 0-5 /HPF " /HPF    Squamous Epithelials Urine Few (A) None Seen /LPF    Narrative    Urine Culture not indicated   Wet prep - Clinic Collect     Status: Abnormal    Specimen: Vagina; Swab   Result Value Ref Range    Trichomonas Absent Absent    Yeast Absent Absent    Clue Cells Present (A) Absent    WBCs/high power field 1+ (A) None                     The use of Dragon/[employer] dictation services may have been used to construct the content in this note; any grammatical or spelling errors are non-intentional. Please contact the author of this note directly if you are in need of any clarification.

## 2024-08-09 LAB — BACTERIA UR CULT: ABNORMAL

## 2024-08-12 DIAGNOSIS — Z30.41 ENCOUNTER FOR SURVEILLANCE OF CONTRACEPTIVE PILLS: ICD-10-CM

## 2024-08-12 DIAGNOSIS — F33.42 RECURRENT MAJOR DEPRESSIVE DISORDER, IN FULL REMISSION (H): ICD-10-CM

## 2024-08-12 RX ORDER — SERTRALINE HYDROCHLORIDE 100 MG/1
100 TABLET, FILM COATED ORAL DAILY
Qty: 90 TABLET | Refills: 0 | Status: SHIPPED | OUTPATIENT
Start: 2024-08-12 | End: 2024-09-12

## 2024-08-12 RX ORDER — NORGESTIMATE AND ETHINYL ESTRADIOL 0.25-0.035
1 KIT ORAL DAILY
Qty: 84 TABLET | Refills: 0 | Status: SHIPPED | OUTPATIENT
Start: 2024-08-12 | End: 2024-09-12

## 2024-08-12 NOTE — TELEPHONE ENCOUNTER
"  From refill fax from [facility].  Added the Cecy Tablet 28S to this med refill.  \"Take 1 tablet by mouth daily generic equivalent to Ortho- Cyclen.    ESTRELLA Denise   "

## 2024-09-12 ENCOUNTER — OFFICE VISIT (OUTPATIENT)
Dept: FAMILY MEDICINE | Facility: CLINIC | Age: 49
End: 2024-09-12
Payer: COMMERCIAL

## 2024-09-12 ENCOUNTER — MYC MEDICAL ADVICE (OUTPATIENT)
Dept: FAMILY MEDICINE | Facility: CLINIC | Age: 49
End: 2024-09-12

## 2024-09-12 ENCOUNTER — PATIENT OUTREACH (OUTPATIENT)
Dept: ONCOLOGY | Facility: CLINIC | Age: 49
End: 2024-09-12

## 2024-09-12 VITALS
RESPIRATION RATE: 16 BRPM | SYSTOLIC BLOOD PRESSURE: 128 MMHG | BODY MASS INDEX: 20.78 KG/M2 | WEIGHT: 121.7 LBS | HEART RATE: 57 BPM | OXYGEN SATURATION: 98 % | HEIGHT: 64 IN | TEMPERATURE: 97.1 F | DIASTOLIC BLOOD PRESSURE: 77 MMHG

## 2024-09-12 DIAGNOSIS — Z80.3 FAMILY HISTORY OF BREAST CANCER: ICD-10-CM

## 2024-09-12 DIAGNOSIS — F33.42 RECURRENT MAJOR DEPRESSIVE DISORDER, IN FULL REMISSION (H): ICD-10-CM

## 2024-09-12 DIAGNOSIS — Z00.00 ROUTINE GENERAL MEDICAL EXAMINATION AT A HEALTH CARE FACILITY: ICD-10-CM

## 2024-09-12 DIAGNOSIS — Z13.1 SCREENING FOR DIABETES MELLITUS: ICD-10-CM

## 2024-09-12 DIAGNOSIS — N95.2 VAGINAL ATROPHY: ICD-10-CM

## 2024-09-12 DIAGNOSIS — Z30.41 ENCOUNTER FOR SURVEILLANCE OF CONTRACEPTIVE PILLS: ICD-10-CM

## 2024-09-12 DIAGNOSIS — E78.2 MIXED HYPERLIPIDEMIA: ICD-10-CM

## 2024-09-12 DIAGNOSIS — Z51.81 ENCOUNTER FOR THERAPEUTIC DRUG MONITORING: ICD-10-CM

## 2024-09-12 LAB
ALBUMIN SERPL BCG-MCNC: 4.3 G/DL (ref 3.5–5.2)
ALP SERPL-CCNC: 40 U/L (ref 40–150)
ALT SERPL W P-5'-P-CCNC: 23 U/L (ref 0–50)
ANION GAP SERPL CALCULATED.3IONS-SCNC: 10 MMOL/L (ref 7–15)
AST SERPL W P-5'-P-CCNC: 31 U/L (ref 0–45)
BILIRUB SERPL-MCNC: 0.3 MG/DL
BUN SERPL-MCNC: 13.4 MG/DL (ref 6–20)
CALCIUM SERPL-MCNC: 9.1 MG/DL (ref 8.8–10.4)
CHLORIDE SERPL-SCNC: 103 MMOL/L (ref 98–107)
CHOLEST SERPL-MCNC: 273 MG/DL
CREAT SERPL-MCNC: 0.77 MG/DL (ref 0.51–0.95)
EGFRCR SERPLBLD CKD-EPI 2021: >90 ML/MIN/1.73M2
ERYTHROCYTE [DISTWIDTH] IN BLOOD BY AUTOMATED COUNT: 12.4 % (ref 10–15)
FASTING STATUS PATIENT QL REPORTED: YES
FASTING STATUS PATIENT QL REPORTED: YES
GLUCOSE SERPL-MCNC: 91 MG/DL (ref 70–99)
HBA1C MFR BLD: 5 % (ref 0–5.6)
HCO3 SERPL-SCNC: 25 MMOL/L (ref 22–29)
HCT VFR BLD AUTO: 37.2 % (ref 35–47)
HDLC SERPL-MCNC: 68 MG/DL
HGB BLD-MCNC: 11.8 G/DL (ref 11.7–15.7)
LDLC SERPL CALC-MCNC: 177 MG/DL
MCH RBC QN AUTO: 30.4 PG (ref 26.5–33)
MCHC RBC AUTO-ENTMCNC: 31.7 G/DL (ref 31.5–36.5)
MCV RBC AUTO: 96 FL (ref 78–100)
NONHDLC SERPL-MCNC: 205 MG/DL
PLATELET # BLD AUTO: 367 10E3/UL (ref 150–450)
POTASSIUM SERPL-SCNC: 4.1 MMOL/L (ref 3.4–5.3)
PROT SERPL-MCNC: 7.3 G/DL (ref 6.4–8.3)
RBC # BLD AUTO: 3.88 10E6/UL (ref 3.8–5.2)
SODIUM SERPL-SCNC: 138 MMOL/L (ref 135–145)
TRIGL SERPL-MCNC: 142 MG/DL
WBC # BLD AUTO: 8.1 10E3/UL (ref 4–11)

## 2024-09-12 PROCEDURE — 83036 HEMOGLOBIN GLYCOSYLATED A1C: CPT | Performed by: FAMILY MEDICINE

## 2024-09-12 PROCEDURE — 80053 COMPREHEN METABOLIC PANEL: CPT | Performed by: FAMILY MEDICINE

## 2024-09-12 PROCEDURE — 36415 COLL VENOUS BLD VENIPUNCTURE: CPT | Performed by: FAMILY MEDICINE

## 2024-09-12 PROCEDURE — 99396 PREV VISIT EST AGE 40-64: CPT | Performed by: FAMILY MEDICINE

## 2024-09-12 PROCEDURE — 99214 OFFICE O/P EST MOD 30 MIN: CPT | Mod: 25 | Performed by: FAMILY MEDICINE

## 2024-09-12 PROCEDURE — 80061 LIPID PANEL: CPT | Performed by: FAMILY MEDICINE

## 2024-09-12 PROCEDURE — 85027 COMPLETE CBC AUTOMATED: CPT | Performed by: FAMILY MEDICINE

## 2024-09-12 RX ORDER — CYCLOBENZAPRINE HCL 5 MG
5-10 TABLET ORAL 3 TIMES DAILY PRN
Qty: 30 TABLET | Refills: 0 | Status: CANCELLED | OUTPATIENT
Start: 2024-09-12

## 2024-09-12 RX ORDER — NORGESTIMATE AND ETHINYL ESTRADIOL 0.25-0.035
1 KIT ORAL DAILY
Qty: 84 TABLET | Refills: 3 | Status: SHIPPED | OUTPATIENT
Start: 2024-09-12

## 2024-09-12 RX ORDER — SERTRALINE HYDROCHLORIDE 100 MG/1
100 TABLET, FILM COATED ORAL DAILY
Qty: 90 TABLET | Refills: 3 | Status: SHIPPED | OUTPATIENT
Start: 2024-09-12

## 2024-09-12 ASSESSMENT — PATIENT HEALTH QUESTIONNAIRE - PHQ9
SUM OF ALL RESPONSES TO PHQ QUESTIONS 1-9: 2
SUM OF ALL RESPONSES TO PHQ QUESTIONS 1-9: 2
10. IF YOU CHECKED OFF ANY PROBLEMS, HOW DIFFICULT HAVE THESE PROBLEMS MADE IT FOR YOU TO DO YOUR WORK, TAKE CARE OF THINGS AT HOME, OR GET ALONG WITH OTHER PEOPLE: SOMEWHAT DIFFICULT

## 2024-09-12 ASSESSMENT — PAIN SCALES - GENERAL: PAINLEVEL: NO PAIN (0)

## 2024-09-12 NOTE — PROGRESS NOTES
Writer received referral to Cancer Risk Management/Genetic Counseling.    Referred for:   mom h/o breast cancer in 40's    Referred By    Provider Department Location Phone   Patricia Long MD [facility] [phone]       Referral reviewed for appropriate plan, and sent to New Patient Scheduling ([phone]) for completion.    Jenniffer Bass RN, BSN  Oncology New Patient Nurse Navigator   [facility]

## 2024-09-12 NOTE — PROGRESS NOTES
Preventive Care Visit  [facility]  Patricia Long MD, Family Medicine  Sep 12, 2024      Assessment & Plan     Routine general medical examination at a health care facility  - mild RLQ abdominal tenderness on exam, no abdominal/pelvic pain or other concerning symptoms, offered US which she declined, continue to monitor   - followed by dermatology   - PRIMARY CARE FOLLOW-UP SCHEDULING; Future    Recurrent major depressive disorder, in full remission (H24)      8/4/2022     9:32 PM 5/23/2023    11:05 PM 9/12/2024     9:11 AM   PHQ   PHQ-9 Total Score 2 2 2   Q9: Thoughts of better off dead/self-harm past 2 weeks Not at all Not at all Not at all    - stable, refill below   - sertraline (ZOLOFT) 100 MG tablet; Take 1 tablet (100 mg) by mouth daily.    Mixed hyperlipidemia  - Lipid panel reflex to direct LDL Fasting; Future  - Lipid panel reflex to direct LDL Fasting    Encounter for surveillance of contraceptive pills  - opted to continue birth control  - norgestimate-ethinyl estradiol (ORTHO-CYCLEN) 0.25-35 MG-MCG tablet; Take 1 tablet by mouth daily.    Encounter for therapeutic drug monitoring  - CBC with platelets; Future  - Comprehensive metabolic panel (BMP + Alb, Alk Phos, ALT, AST, Total. Bili, TP); Future  - CBC with platelets  - Comprehensive metabolic panel (BMP + Alb, Alk Phos, ALT, AST, Total. Bili, TP)    Screening for diabetes mellitus  - Hemoglobin A1c; Future  - Hemoglobin A1c    Vaginal atrophy  - likely contributing to UTI  - discussed tx including estrogen cream  - will schedule with GYN if she opts to start tx     Family history of breast cancer  - genetic referral             Counseling  Appropriate preventive services were addressed with this patient via screening, questionnaire, or discussion as appropriate for fall prevention, nutrition, physical activity, Tobacco-use cessation, social engagement, weight loss and cognition.  Checklist reviewing preventive  services available has been given to the patient.  Reviewed patient's diet, addressing concerns and/or questions.           Subjective   Maria G is a 49 year old, presenting for the following:  Physical (Med refills)        9/12/2024     9:20 AM   Additional Questions   Roomed by Seble GUSMAN        Health Care Directive  Patient does not have a Health Care Directive or Living Will: Discussed advance care planning with patient; information given to patient to review.    HPI    UTI - June and Aug 2024. Symptoms improved with antibiotics. No current symptoms. No vaginal dryness or pain with intercourse.           9/9/2024   General Health   How would you rate your overall physical health? Good   Feel stress (tense, anxious, or unable to sleep) To some extent      (!) STRESS CONCERN      9/9/2024   Nutrition   Three or more servings of calcium each day? (!) NO   Diet: Regular (no restrictions)   How many servings of fruit and vegetables per day? (!) 0-1   How many sweetened beverages each day? (!) 2            9/9/2024   Exercise   Days per week of moderate/strenous exercise 4 days   Average minutes spent exercising at this level 90 min            9/9/2024   Social Factors   Frequency of gathering with friends or relatives Twice a week   Worry food won't last until get money to buy more No   Food not last or not have enough money for food? No   Do you have housing? (Housing is defined as stable permanent housing and does not include staying ouside in a car, in a tent, in an abandoned building, in an overnight shelter, or couch-surfing.) Yes   Are you worried about losing your housing? No   Lack of transportation? No   Unable to get utilities (heat,electricity)? No            9/9/2024   Fall Risk   Fallen 2 or more times in the past year? No   Trouble with walking or balance? No             9/9/2024   Dental   Dentist two times every year? Yes            9/9/2024   TB Screening   Were you born outside of the [place]? No           Today's PHQ-9 Score:       9/12/2024     9:11 AM   PHQ-9 SCORE   PHQ-9 Total Score MyChart 2 (Minimal depression)   PHQ-9 Total Score 2         9/9/2024   Substance Use   Alcohol more than 3/day or more than 7/wk No   Do you use any other substances recreationally? (!) CANNABIS PRODUCTS        Social History     Tobacco Use    Smoking status: Never    Smokeless tobacco: Never   Vaping Use    Vaping status: Never Used   Substance Use Topics    Alcohol use: Yes    Drug use: Never           10/17/2023   LAST FHS-7 RESULTS   1st degree relative breast or ovarian cancer Yes   Any relative bilateral breast cancer No   Any male have breast cancer No   Any ONE woman have BOTH breast AND ovarian cancer No   Any woman with breast cancer before 50yrs Yes   2 or more relatives with breast AND/OR ovarian cancer No   2 or more relatives with breast AND/OR bowel cancer No   Sister has had genetic testing which was negative.                9/9/2024   STI Screening   New sexual partner(s) since last STI/HIV test? No        History of abnormal Pap smear: No - age 30- 64 PAP with HPV every 5 years recommended        Latest Ref Rng & Units 9/11/2023    11:21 AM 7/7/2020    12:03 PM 7/7/2020    10:06 AM   PAP / HPV   PAP  Negative for Intraepithelial Lesion or Malignancy (NILM)      PAP (Historical)    NIL    HPV 16 DNA Negative Negative  Negative     HPV 18 DNA Negative Negative  Negative     Other HR HPV Negative Negative  Negative       ASCVD Risk   The 10-year ASCVD risk score (Elsa ALEMAN, et al., 2019) is: 1.4%    Values used to calculate the score:      Age: 49 years      Sex: Female      Is Non- : No      Diabetic: No      Tobacco smoker: No      Systolic Blood Pressure: 128 mmHg      Is BP treated: No      HDL Cholesterol: 65 mg/dL      Total Cholesterol: 259 mg/dL            9/9/2024   Contraception/Family Planning   Questions about contraception or family planning No           Reviewed and  "updated as needed this visit by Provider                             Objective    Exam  /77   Pulse 57   Temp 97.1  F (36.2  C) (Temporal)   Resp 16   Ht 1.619 m (5' 3.74\")   Wt 55.2 kg (121 lb 11.2 oz)   LMP 08/18/2024 (Exact Date)   SpO2 98%   BMI 21.06 kg/m     Estimated body mass index is 21.06 kg/m  as calculated from the following:    Height as of this encounter: 1.619 m (5' 3.74\").    Weight as of this encounter: 55.2 kg (121 lb 11.2 oz).    Physical Exam  GENERAL: alert and no distress  EYES: Eyes grossly normal to inspection  HENT: ear canals and TM's normal  NECK: no adenopathy, no asymmetry, masses, or scars  RESP: lungs clear to auscultation - no rales, rhonchi or wheezes  BREAST: normal without masses, tenderness or nipple discharge and no palpable axillary masses or adenopathy  CV: regular rate and rhythm, normal S1 S2  ABDOMEN: soft, nontender, no hepatosplenomegaly, no masses and bowel sounds normal   (female): vaginal mucosal atrophy  MS: no gross musculoskeletal defects noted, no edema  SKIN: no suspicious lesions or rashes  NEURO: Normal strength and tone, mentation intact and speech normal  PSYCH: mentation appears normal, affect normal        Signed Electronically by: Patricia Long MD    Answers submitted by the patient for this visit:  Patient Health Questionnaire (Submitted on 9/12/2024)  If you checked off any problems, how difficult have these problems made it for you to do your work, take care of things at home, or get along with other people?: Somewhat difficult  PHQ9 TOTAL SCORE: 2    "

## 2024-09-12 NOTE — PATIENT INSTRUCTIONS
Patient Education   Preventive Care Advice   This is general advice given by our system to help you stay healthy. However, your care team may have specific advice just for you. Please talk to your care team about your preventive care needs.  Nutrition  Eat 5 or more servings of fruits and vegetables each day.  Try wheat bread, brown rice and whole grain pasta (instead of white bread, rice, and pasta).  Get enough calcium and vitamin D. Check the label on foods and aim for 100% of the RDA (recommended daily allowance).  Lifestyle  Exercise at least 150 minutes each week  (30 minutes a day, 5 days a week).  Do muscle strengthening activities 2 days a week. These help control your weight and prevent disease.  No smoking.  Wear sunscreen to prevent skin cancer.  Have a dental exam and cleaning every 6 months.  Yearly exams  See your health care team every year to talk about:  Any changes in your health.  Any medicines your care team has prescribed.  Preventive care, family planning, and ways to prevent chronic diseases.  Shots (vaccines)   HPV shots (up to age 26), if you've never had them before.  Hepatitis B shots (up to age 59), if you've never had them before.  COVID-19 shot: Get this shot when it's due.  Flu shot: Get a flu shot every year.  Tetanus shot: Get a tetanus shot every 10 years.  Pneumococcal, hepatitis A, and RSV shots: Ask your care team if you need these based on your risk.  Shingles shot (for age 50 and up)  General health tests  Diabetes screening:  Starting at age 35, Get screened for diabetes at least every 3 years.  If you are younger than age 35, ask your care team if you should be screened for diabetes.  Cholesterol test: At age 39, start having a cholesterol test every 5 years, or more often if advised.  Bone density scan (DEXA): At age 50, ask your care team if you should have this scan for osteoporosis (brittle bones).  Hepatitis C: Get tested at least once in your life.  STIs (sexually  transmitted infections)  Before age 24: Ask your care team if you should be screened for STIs.  After age 24: Get screened for STIs if you're at risk. You are at risk for STIs (including HIV) if:  You are sexually active with more than one person.  You don't use condoms every time.  You or a partner was diagnosed with a sexually transmitted infection.  If you are at risk for HIV, ask about PrEP medicine to prevent HIV.  Get tested for HIV at least once in your life, whether you are at risk for HIV or not.  Cancer screening tests  Cervical cancer screening: If you have a cervix, begin getting regular cervical cancer screening tests starting at age 21.  Breast cancer scan (mammogram): If you've ever had breasts, begin having regular mammograms starting at age 40. This is a scan to check for breast cancer.  Colon cancer screening: It is important to start screening for colon cancer at age 45.  Have a colonoscopy test every 10 years (or more often if you're at risk) Or, ask your provider about stool tests like a FIT test every year or Cologuard test every 3 years.  To learn more about your testing options, visit:   .  For help making a decision, visit:   https://bit.ly/qf52623.  Prostate cancer screening test: If you have a prostate, ask your care team if a prostate cancer screening test (PSA) at age 55 is right for you.  Lung cancer screening: If you are a current or former smoker ages 50 to 80, ask your care team if ongoing lung cancer screenings are right for you.  For informational purposes only. Not to replace the advice of your health care provider. Copyright   2023 [facility]. All rights reserved. Clinically reviewed by the [facility]. [employer] [id number] - REV 01/24.  Substance Use Disorder: Care Instructions  Overview     You can improve your life and health by stopping your use of alcohol or drugs. When you don't drink or use drugs, you may feel and sleep better. You may  get along better with your family, friends, and coworkers. There are medicines and programs that can help with substance use disorder.  How can you care for yourself at home?  Here are some ways to help you stay sober and prevent relapse.  If you have been given medicine to help keep you sober or reduce your cravings, be sure to take it exactly as prescribed.  Talk to your doctor about programs that can help you stop using drugs or drinking alcohol.  Do not keep alcohol or drugs in your home.  Plan ahead. Think about what you'll say if other people ask you to drink or use drugs. Try not to spend time with people who drink or use drugs.  Use the time and money spent on drinking or drugs to do something that's important to you.  Preventing a relapse  Have a plan to deal with relapse. Learn to recognize changes in your thinking that lead you to drink or use drugs. Get help before you start to drink or use drugs again.  Try to stay away from situations, friends, or places that may lead you to drink or use drugs.  If you feel the need to drink alcohol or use drugs again, seek help right away. Call a trusted friend or family member. Some people get support from organizations such as Narcotics Anonymous or [employer] or from treatment facilities.  If you relapse, get help as soon as you can. Some people make a plan with another person that outlines what they want that person to do for them if they relapse. The plan usually includes how to handle the relapse and who to notify in case of relapse.  Don't give up. Remember that a relapse doesn't mean that you have failed. Use the experience to learn the triggers that lead you to drink or use drugs. Then quit again. Recovery is a lifelong process. Many people have several relapses before they are able to quit for good.  Follow-up care is a key part of your treatment and safety. Be sure to make and go to all appointments, and call your doctor if you are having problems. It's  "also a good idea to know your test results and keep a list of the medicines you take.  When should you call for help?   Call 911  anytime you think you may need emergency care. For example, call if you or someone else:    Has overdosed or has withdrawal signs. Be sure to tell the emergency workers that you are or someone else is using or trying to quit using drugs. Overdose or withdrawal signs may include:  Losing consciousness.  Seizure.  Seeing or hearing things that aren't there (hallucinations).     Is thinking or talking about suicide or harming others.   Where to get help 24 hours a day, 7 days a week   If you or someone you know talks about suicide, self-harm, a mental health crisis, a substance use crisis, or any other kind of emotional distress, get help right away. You can:    Call the Suicide and Crisis Lifeline at 988.     Call 6-732-485-TALK (1-446.684.5439).     Text HOME to 177140 to access the Crisis Text Line.   Consider saving these numbers in your phone.  Go to [email or web address] for more information or to chat online.  Call your doctor now or seek immediate medical care if:    You are having withdrawal symptoms. These may include nausea or vomiting, sweating, shakiness, and anxiety.   Watch closely for changes in your health, and be sure to contact your doctor if:    You have a relapse.     You need more help or support to stop.   Where can you learn more?  Go to https://www.[employer].net/patiented  Enter H573 in the search box to learn more about \"Substance Use Disorder: Care Instructions.\"  Current as of: November 15, 2023               Content Version: 14.0    8720-7735 [employer].   Care instructions adapted under license by your healthcare professional. If you have questions about a medical condition or this instruction, always ask your healthcare professional. [employer] disclaims any warranty or liability for your use of this information.         "

## 2024-10-28 DIAGNOSIS — Z30.41 ENCOUNTER FOR SURVEILLANCE OF CONTRACEPTIVE PILLS: ICD-10-CM

## 2024-10-28 RX ORDER — NORGESTIMATE AND ETHINYL ESTRADIOL 0.25-0.035
1 KIT ORAL DAILY
Qty: 84 TABLET | Refills: 3 | Status: CANCELLED | OUTPATIENT
Start: 2024-10-28

## 2024-10-28 RX ORDER — NORGESTIMATE AND ETHINYL ESTRADIOL 0.25-0.035
1 KIT ORAL DAILY
Qty: 84 TABLET | Refills: 2 | Status: SHIPPED | OUTPATIENT
Start: 2024-10-28

## 2024-11-05 DIAGNOSIS — F33.42 RECURRENT MAJOR DEPRESSIVE DISORDER, IN FULL REMISSION (H): ICD-10-CM

## 2024-11-05 RX ORDER — SERTRALINE HYDROCHLORIDE 100 MG/1
100 TABLET, FILM COATED ORAL DAILY
Qty: 90 TABLET | Refills: 3 | OUTPATIENT
Start: 2024-11-05

## 2024-11-05 NOTE — TELEPHONE ENCOUNTER
Pending Prescriptions:                       Disp   Refills    sertraline (ZOLOFT) 100 MG tablet         90 tab*3            Sig: Take 1 tablet (100 mg) by mouth daily.

## 2025-03-11 ENCOUNTER — TELEPHONE (OUTPATIENT)
Dept: DERMATOLOGY | Facility: CLINIC | Age: 50
End: 2025-03-11
Payer: COMMERCIAL

## 2025-03-11 NOTE — TELEPHONE ENCOUNTER
3/11 Left Voicemail (1st Attempt) and Sent Mychart (1st Attempt) for the patient to call back and schedule the following:    Appointment type: Return Dermatology  Provider: Rd  Return date: 10/23/25  Specialty phone number: [phone]  Additional appointment(s) needed: N/A  Additonal Notes: N/A

## 2025-06-16 DIAGNOSIS — Z30.41 ENCOUNTER FOR SURVEILLANCE OF CONTRACEPTIVE PILLS: ICD-10-CM

## 2025-06-16 RX ORDER — NORGESTIMATE AND ETHINYL ESTRADIOL 0.25-0.035
1 KIT ORAL DAILY
Qty: 84 TABLET | Refills: 0 | Status: SHIPPED | OUTPATIENT
Start: 2025-06-16

## 2025-06-16 NOTE — TELEPHONE ENCOUNTER
Pending Prescriptions:                       Disp   Refills    norgestimate-ethinyl estradiol (ORTHO-CYC*84 tab*2            Sig: Take 1 tablet by mouth daily.

## 2025-07-02 ENCOUNTER — MYC REFILL (OUTPATIENT)
Dept: FAMILY MEDICINE | Facility: CLINIC | Age: 50
End: 2025-07-02
Payer: COMMERCIAL

## 2025-07-02 DIAGNOSIS — F33.42 RECURRENT MAJOR DEPRESSIVE DISORDER, IN FULL REMISSION: ICD-10-CM

## 2025-07-02 RX ORDER — SERTRALINE HYDROCHLORIDE 100 MG/1
100 TABLET, FILM COATED ORAL DAILY
Qty: 90 TABLET | Refills: 0 | Status: SHIPPED | OUTPATIENT
Start: 2025-07-02

## 2025-08-06 ENCOUNTER — PATIENT OUTREACH (OUTPATIENT)
Dept: CARE COORDINATION | Facility: CLINIC | Age: 50
End: 2025-08-06
Payer: COMMERCIAL

## 2025-08-07 DIAGNOSIS — Z12.11 COLON CANCER SCREENING: ICD-10-CM

## 2025-08-21 ENCOUNTER — ORDERS ONLY (AUTO-RELEASED) (OUTPATIENT)
Dept: URGENT CARE | Facility: CLINIC | Age: 50
End: 2025-08-21
Payer: COMMERCIAL

## 2025-08-21 DIAGNOSIS — Z12.11 COLON CANCER SCREENING: ICD-10-CM

## 2025-09-02 DIAGNOSIS — Z30.41 ENCOUNTER FOR SURVEILLANCE OF CONTRACEPTIVE PILLS: ICD-10-CM

## 2025-09-02 RX ORDER — NORGESTIMATE AND ETHINYL ESTRADIOL 0.25-0.035
1 KIT ORAL DAILY
Qty: 84 TABLET | Refills: 0 | Status: SHIPPED | OUTPATIENT
Start: 2025-09-02